# Patient Record
Sex: FEMALE | Race: WHITE | Employment: FULL TIME | URBAN - METROPOLITAN AREA
[De-identification: names, ages, dates, MRNs, and addresses within clinical notes are randomized per-mention and may not be internally consistent; named-entity substitution may affect disease eponyms.]

---

## 2018-01-03 ENCOUNTER — HOSPITAL ENCOUNTER (OUTPATIENT)
Dept: OTHER | Age: 63
Discharge: HOME | End: 2018-01-03
Attending: INTERNAL MEDICINE | Admitting: INTERNAL MEDICINE

## 2018-01-03 LAB
ALBUMIN SERPL-MCNC: 4.2 GM/DL (ref 3.4–5)
ALP BLD-CCNC: 59 IU/L (ref 40–128)
ALT SERPL-CCNC: 43 U/L (ref 10–40)
ANION GAP SERPL CALCULATED.3IONS-SCNC: 17 MMOL/L (ref 4–16)
AST SERPL-CCNC: 39 IU/L (ref 15–37)
BILIRUB SERPL-MCNC: 0.2 MG/DL (ref 0–1)
BUN BLDV-MCNC: 20 MG/DL (ref 6–23)
CALCIUM SERPL-MCNC: 9.4 MG/DL (ref 8.3–10.6)
CHLORIDE BLD-SCNC: 99 MMOL/L (ref 99–110)
CO2: 30 MMOL/L (ref 21–32)
CREAT SERPL-MCNC: 0.8 MG/DL (ref 0.6–1.1)
GFR AFRICAN AMERICAN: >60 ML/MIN/1.73M2
GFR NON-AFRICAN AMERICAN: >60 ML/MIN/1.73M2
GLUCOSE BLD-MCNC: 88 MG/DL (ref 70–99)
POTASSIUM SERPL-SCNC: 3.6 MMOL/L (ref 3.5–5.1)
SODIUM BLD-SCNC: 146 MMOL/L (ref 135–145)
TOTAL PROTEIN: 6.9 GM/DL (ref 6.4–8.2)

## 2018-01-06 LAB — CA 27.29: 19.4 U/ML

## 2018-04-18 ENCOUNTER — HOSPITAL ENCOUNTER (OUTPATIENT)
Dept: OTHER | Age: 63
Discharge: HOME | End: 2018-04-18
Attending: INTERNAL MEDICINE | Admitting: INTERNAL MEDICINE

## 2018-04-18 LAB
ALBUMIN SERPL-MCNC: 4.5 GM/DL (ref 3.4–5)
ALP BLD-CCNC: 54 IU/L (ref 40–129)
ALT SERPL-CCNC: 21 U/L (ref 10–40)
ANION GAP SERPL CALCULATED.3IONS-SCNC: 10 MMOL/L (ref 4–16)
AST SERPL-CCNC: 20 IU/L (ref 15–37)
BILIRUB SERPL-MCNC: 0.4 MG/DL (ref 0–1)
BUN BLDV-MCNC: 17 MG/DL (ref 6–23)
CALCIUM SERPL-MCNC: 9.5 MG/DL (ref 8.3–10.6)
CHLORIDE BLD-SCNC: 105 MMOL/L (ref 99–110)
CO2: 29 MMOL/L (ref 21–32)
CREAT SERPL-MCNC: 0.7 MG/DL (ref 0.6–1.1)
GFR AFRICAN AMERICAN: >60 ML/MIN/1.73M2
GFR NON-AFRICAN AMERICAN: >60 ML/MIN/1.73M2
GLUCOSE BLD-MCNC: 83 MG/DL (ref 70–99)
POTASSIUM SERPL-SCNC: 4.3 MMOL/L (ref 3.5–5.1)
SODIUM BLD-SCNC: 144 MMOL/L (ref 135–145)
TOTAL PROTEIN: 7.3 GM/DL (ref 6.4–8.2)

## 2018-04-19 ENCOUNTER — HOSPITAL ENCOUNTER (OUTPATIENT)
Dept: MAMMOGRAPHY | Age: 63
Discharge: HOME | End: 2018-04-19
Attending: INTERNAL MEDICINE | Admitting: INTERNAL MEDICINE

## 2018-04-19 DIAGNOSIS — Z12.31 VISIT FOR SCREENING MAMMOGRAM: ICD-10-CM

## 2018-05-02 ENCOUNTER — HOSPITAL ENCOUNTER (OUTPATIENT)
Dept: LAB | Age: 63
Discharge: HOME | End: 2018-05-02
Attending: INTERNAL MEDICINE | Admitting: INTERNAL MEDICINE

## 2018-05-02 LAB
ALBUMIN SERPL-MCNC: 4.3 GM/DL (ref 3.4–5)
ALP BLD-CCNC: 53 IU/L (ref 40–129)
ALT SERPL-CCNC: 19 U/L (ref 10–40)
ANION GAP SERPL CALCULATED.3IONS-SCNC: 12 MMOL/L (ref 4–16)
AST SERPL-CCNC: 19 IU/L (ref 15–37)
BASOPHILS ABSOLUTE: 0 K/CU MM
BASOPHILS RELATIVE PERCENT: 0.7 % (ref 0–1)
BILIRUB SERPL-MCNC: 0.3 MG/DL (ref 0–1)
BUN BLDV-MCNC: 20 MG/DL (ref 6–23)
C-REACTIVE PROTEIN, HIGH SENSITIVITY: 0.9 MG/L
CALCIUM SERPL-MCNC: 9.5 MG/DL (ref 8.3–10.6)
CHLORIDE BLD-SCNC: 104 MMOL/L (ref 99–110)
CO2: 27 MMOL/L (ref 21–32)
CREAT SERPL-MCNC: 0.8 MG/DL (ref 0.6–1.1)
DIFFERENTIAL TYPE: ABNORMAL
EOSINOPHILS ABSOLUTE: 0.2 K/CU MM
EOSINOPHILS RELATIVE PERCENT: 2.5 % (ref 0–3)
ERYTHROCYTE SEDIMENTATION RATE: 10 MM/HR (ref 0–30)
GFR AFRICAN AMERICAN: >60 ML/MIN/1.73M2
GFR NON-AFRICAN AMERICAN: >60 ML/MIN/1.73M2
GLUCOSE FASTING: 88 MG/DL (ref 70–99)
HCT VFR BLD CALC: 45.1 % (ref 37–47)
HEMOGLOBIN: 14.7 GM/DL (ref 12.5–16)
IGA: 215 MG/DL (ref 69–382)
IMMATURE NEUTROPHIL %: 0.3 % (ref 0–0.43)
LYMPHOCYTES ABSOLUTE: 1.8 K/CU MM
LYMPHOCYTES RELATIVE PERCENT: 29.7 % (ref 24–44)
MCH RBC QN AUTO: 32 PG (ref 27–31)
MCHC RBC AUTO-ENTMCNC: 32.6 % (ref 32–36)
MCV RBC AUTO: 98 FL (ref 78–100)
MONOCYTES ABSOLUTE: 0.6 K/CU MM
MONOCYTES RELATIVE PERCENT: 10.2 % (ref 0–4)
PDW BLD-RTO: 13.2 % (ref 11.7–14.9)
PLATELET # BLD: 210 K/CU MM (ref 140–440)
PMV BLD AUTO: 10.2 FL (ref 7.5–11.1)
POTASSIUM SERPL-SCNC: 4.5 MMOL/L (ref 3.5–5.1)
RBC # BLD: 4.6 M/CU MM (ref 4.2–5.4)
SEGMENTED NEUTROPHILS ABSOLUTE COUNT: 3.3 K/CU MM
SEGMENTED NEUTROPHILS RELATIVE PERCENT: 56.6 % (ref 36–66)
SODIUM BLD-SCNC: 143 MMOL/L (ref 135–145)
TOTAL IMMATURE NEUTOROPHIL: 0.02 K/CU MM
TOTAL PROTEIN: 7.2 GM/DL (ref 6.4–8.2)
WBC # BLD: 5.9 K/CU MM (ref 4–10.5)

## 2018-05-04 LAB
TISSUE TRANSGLUTAMINASE ANTIBODY: 1
TRANSGLUTAMINASE IGA: 0

## 2018-05-14 ENCOUNTER — HOSPITAL ENCOUNTER (OUTPATIENT)
Dept: CT IMAGING | Age: 63
Discharge: HOME | End: 2018-05-14
Attending: INTERNAL MEDICINE | Admitting: INTERNAL MEDICINE

## 2018-05-14 DIAGNOSIS — Z85.3 PERSONAL HISTORY OF MALIGNANT NEOPLASM OF BREAST: ICD-10-CM

## 2018-10-15 ENCOUNTER — HOSPITAL ENCOUNTER (OUTPATIENT)
Age: 63
Setting detail: SPECIMEN
Discharge: HOME | End: 2018-10-15
Payer: COMMERCIAL

## 2018-10-15 LAB
ALBUMIN SERPL-MCNC: 4.3 GM/DL (ref 3.4–5)
ALP BLD-CCNC: 61 IU/L (ref 40–129)
ALT SERPL-CCNC: 18 U/L (ref 10–40)
ANION GAP SERPL CALCULATED.3IONS-SCNC: 12 MMOL/L (ref 4–16)
AST SERPL-CCNC: 16 IU/L (ref 15–37)
BILIRUB SERPL-MCNC: 0.3 MG/DL (ref 0–1)
BUN BLDV-MCNC: 20 MG/DL (ref 6–23)
CALCIUM SERPL-MCNC: 9.2 MG/DL (ref 8.3–10.6)
CHLORIDE BLD-SCNC: 103 MMOL/L (ref 99–110)
CO2: 25 MMOL/L (ref 21–32)
CREAT SERPL-MCNC: 0.7 MG/DL (ref 0.6–1.1)
GFR AFRICAN AMERICAN: >60 ML/MIN/1.73M2
GFR NON-AFRICAN AMERICAN: >60 ML/MIN/1.73M2
GLUCOSE BLD-MCNC: 86 MG/DL (ref 70–99)
POTASSIUM SERPL-SCNC: 4.2 MMOL/L (ref 3.5–5.1)
SODIUM BLD-SCNC: 140 MMOL/L (ref 135–145)
TOTAL PROTEIN: 6.8 GM/DL (ref 6.4–8.2)

## 2018-10-15 PROCEDURE — 80053 COMPREHEN METABOLIC PANEL: CPT

## 2019-04-24 ENCOUNTER — HOSPITAL ENCOUNTER (OUTPATIENT)
Dept: MAMMOGRAPHY | Age: 64
Discharge: HOME | End: 2019-04-24
Payer: MEDICARE

## 2019-04-24 DIAGNOSIS — M81.0 SENILE OSTEOPOROSIS: ICD-10-CM

## 2019-04-24 DIAGNOSIS — Z12.31 SCREENING MAMMOGRAM, ENCOUNTER FOR: ICD-10-CM

## 2019-04-24 PROCEDURE — 77063 BREAST TOMOSYNTHESIS BI: CPT

## 2019-04-24 PROCEDURE — 77080 DXA BONE DENSITY AXIAL: CPT

## 2020-04-16 PROBLEM — M81.0 AGE-RELATED OSTEOPOROSIS WITHOUT CURRENT PATHOLOGICAL FRACTURE: Status: ACTIVE | Noted: 2020-04-16

## 2020-04-16 PROBLEM — R91.8 OTHER NONSPECIFIC ABNORMAL FINDING OF LUNG FIELD: Status: ACTIVE | Noted: 2020-04-16

## 2020-04-16 PROBLEM — Z85.3 PERSONAL HISTORY OF MALIGNANT NEOPLASM OF BREAST: Status: ACTIVE | Noted: 2020-04-16

## 2020-06-01 ENCOUNTER — HOSPITAL ENCOUNTER (OUTPATIENT)
Dept: MAMMOGRAPHY | Age: 65
Discharge: HOME | End: 2020-06-01
Payer: MEDICARE

## 2020-06-01 PROCEDURE — 77063 BREAST TOMOSYNTHESIS BI: CPT

## 2020-06-04 ENCOUNTER — HOSPITAL ENCOUNTER (OUTPATIENT)
Dept: MAMMOGRAPHY | Age: 65
Discharge: HOME | End: 2020-06-04
Payer: MEDICARE

## 2020-06-04 PROCEDURE — 77065 DX MAMMO INCL CAD UNI: CPT

## 2020-06-11 ENCOUNTER — HOSPITAL ENCOUNTER (OUTPATIENT)
Dept: INFUSION THERAPY | Age: 65
Discharge: HOME | End: 2020-06-11
Payer: MEDICARE

## 2020-06-11 PROCEDURE — 99211 OFF/OP EST MAY X REQ PHY/QHP: CPT

## 2020-09-02 RX ORDER — LETROZOLE 2.5 MG/1
2.5 TABLET, FILM COATED ORAL DAILY
Qty: 30 TABLET | Refills: 2 | Status: SHIPPED | OUTPATIENT
Start: 2020-09-02 | End: 2020-09-08 | Stop reason: SDUPTHER

## 2020-09-02 RX ORDER — LETROZOLE 2.5 MG/1
TABLET, FILM COATED ORAL
COMMUNITY
Start: 2020-07-29 | End: 2020-09-02 | Stop reason: SDUPTHER

## 2020-09-08 ENCOUNTER — TELEPHONE (OUTPATIENT)
Dept: ONCOLOGY | Age: 65
End: 2020-09-08

## 2020-09-08 RX ORDER — LETROZOLE 2.5 MG/1
2.5 TABLET, FILM COATED ORAL DAILY
Qty: 30 TABLET | Refills: 2 | Status: SHIPPED | OUTPATIENT
Start: 2020-09-08 | End: 2020-10-30

## 2020-10-30 RX ORDER — LETROZOLE 2.5 MG/1
TABLET, FILM COATED ORAL
Qty: 90 TABLET | Refills: 0 | Status: SHIPPED | OUTPATIENT
Start: 2020-10-30 | End: 2021-01-07

## 2020-12-17 ENCOUNTER — HOSPITAL ENCOUNTER (OUTPATIENT)
Dept: INFUSION THERAPY | Age: 65
Discharge: HOME | End: 2020-12-17
Payer: MEDICARE

## 2020-12-17 ENCOUNTER — OFFICE VISIT (OUTPATIENT)
Dept: ONCOLOGY | Age: 65
End: 2020-12-17
Payer: MEDICARE

## 2020-12-17 VITALS
OXYGEN SATURATION: 98 % | WEIGHT: 136 LBS | RESPIRATION RATE: 16 BRPM | DIASTOLIC BLOOD PRESSURE: 86 MMHG | BODY MASS INDEX: 24.1 KG/M2 | HEIGHT: 63 IN | TEMPERATURE: 96.1 F | HEART RATE: 77 BPM | SYSTOLIC BLOOD PRESSURE: 154 MMHG

## 2020-12-17 PROBLEM — Z12.31 OTHER SCREENING MAMMOGRAM: Status: ACTIVE | Noted: 2020-12-17

## 2020-12-17 PROBLEM — C50.912 INVASIVE DUCTAL CARCINOMA OF BREAST, FEMALE, LEFT (HCC): Status: ACTIVE | Noted: 2020-12-17

## 2020-12-17 PROCEDURE — G8420 CALC BMI NORM PARAMETERS: HCPCS | Performed by: INTERNAL MEDICINE

## 2020-12-17 PROCEDURE — 4040F PNEUMOC VAC/ADMIN/RCVD: CPT | Performed by: INTERNAL MEDICINE

## 2020-12-17 PROCEDURE — 1036F TOBACCO NON-USER: CPT | Performed by: INTERNAL MEDICINE

## 2020-12-17 PROCEDURE — 3017F COLORECTAL CA SCREEN DOC REV: CPT | Performed by: INTERNAL MEDICINE

## 2020-12-17 PROCEDURE — G8427 DOCREV CUR MEDS BY ELIG CLIN: HCPCS | Performed by: INTERNAL MEDICINE

## 2020-12-17 PROCEDURE — 1090F PRES/ABSN URINE INCON ASSESS: CPT | Performed by: INTERNAL MEDICINE

## 2020-12-17 PROCEDURE — G8399 PT W/DXA RESULTS DOCUMENT: HCPCS | Performed by: INTERNAL MEDICINE

## 2020-12-17 PROCEDURE — 1123F ACP DISCUSS/DSCN MKR DOCD: CPT | Performed by: INTERNAL MEDICINE

## 2020-12-17 PROCEDURE — G8484 FLU IMMUNIZE NO ADMIN: HCPCS | Performed by: INTERNAL MEDICINE

## 2020-12-17 PROCEDURE — 99211 OFF/OP EST MAY X REQ PHY/QHP: CPT

## 2020-12-17 PROCEDURE — 99213 OFFICE O/P EST LOW 20 MIN: CPT | Performed by: INTERNAL MEDICINE

## 2020-12-17 RX ORDER — LOSARTAN POTASSIUM 100 MG/1
TABLET ORAL
COMMUNITY
Start: 2020-12-14 | End: 2020-12-17

## 2020-12-17 RX ORDER — HYDROCHLOROTHIAZIDE 25 MG/1
TABLET ORAL
COMMUNITY
End: 2020-12-17

## 2020-12-17 RX ORDER — LOSARTAN POTASSIUM 100 MG/1
100 TABLET ORAL DAILY
COMMUNITY
Start: 2020-10-13

## 2020-12-17 RX ORDER — ASPIRIN 81 MG/1
81 TABLET ORAL DAILY
COMMUNITY
End: 2020-12-17

## 2020-12-17 RX ORDER — PHENOL 1.4 %
600 AEROSOL, SPRAY (ML) MUCOUS MEMBRANE 2 TIMES DAILY
COMMUNITY
End: 2021-08-03 | Stop reason: SDUPTHER

## 2020-12-17 RX ORDER — ATORVASTATIN CALCIUM 10 MG/1
10 TABLET, FILM COATED ORAL DAILY
COMMUNITY
Start: 2020-10-05

## 2020-12-17 RX ORDER — ASPIRIN 81 MG/1
TABLET, CHEWABLE ORAL
COMMUNITY

## 2020-12-17 RX ORDER — ATORVASTATIN CALCIUM 10 MG/1
TABLET, FILM COATED ORAL
COMMUNITY
Start: 2020-12-14 | End: 2020-12-17

## 2020-12-17 NOTE — PROGRESS NOTES
MA Rooming Questions  Patient: Fernando Prajapati  MRN: [id number]    Date: 12/17/2020        1. Do you have any new issues?   no         2. Do you need any refills on medications?    no    3. Have you had any imaging done since your last visit?   no    4. Have you been hospitalized or seen in the emergency room since your last visit here?   no    5. Did the patient have a depression screening completed today?  No    No data recorded     PHQ-9 Given to (if applicable):               PHQ-9 Score (if applicable):                     [] Positive     []  Negative              Does question #9 need addressed (if applicable)                     [] Yes    []  No               Ryland Cowan MA

## 2020-12-17 NOTE — PROGRESS NOTES
Patient Name: Bertina Aschoff  Patient : 1955  Patient MRN: [id number]     Primary Oncologist: Britton Bonilla MD  Referring Physician: LIZBETH Hernandez ([facility]), Carla Henriquez MD, Claire Hamlin MD       Date of Service: 2020      Chief Complaint:   Chief Complaint   Patient presents with    Follow-up    Results        Active Problem list  1. Personal history of malignant neoplasm of breast    2. Invasive ductal carcinoma of breast, female, left [facility])           HPI:        58-year-old female history of T2 N0 M0,ER [place] HER-2 positive infiltrating ductal carcinoma of the left breast status post lumpectomy sentinel lymph node biopsy on 2015 By Dr. Mimi Greenfield. She was treated with adjuvant Taxotere carboplatin and Herceptin for 6 cycles followed by Herceptin for total one year until 2016. Since she has completed radiation, by Dr Mihir Ferro, Receiving a total dose of 45 gray To the whole breast and 61 gray to the lumpectomy cavity boost. and has been on AI since 2016. She has osteoporosis she is taking calcium vitamin D.   2015 she had a T score -2.1 left femoral trochanter noted to be osteoporotic. 2015 EF 60%  2017 mammogram negative  2017 her CA 27.29 was 21.9. Vitamin D was 40   2017 status post Zometa ×1   She is compliant with her AI. And for the most part has no side effects. He does report though that over the last 4-5 months she does have some left chest wall axillary aches and pains. It is definitely related to when she was picking things up that are heavy. She has not noticed any lymphadenopathy lumps or bumps or weakness or paresthesias or swelling. These symptoms have not gotten worse. And as mentioned she definitely notices a relationship to this in regards to her working.    2019: Dexa scan:IMPRESSION:  Osteopenia by WHO criteria.      20202: Mammogram:IMPRESSION:  Coarse appearing dystrophic calcifications are developing in the lumpectomy  bed in the lateral left breast. These have benign features on the  magnification views and routine annual screening mammography is recommended. BIRADS:  BIRADS - CATEGORY 2    12/10/2020 [place] [address] 925 CA 15-3 34 CBC and CMP within normal limits    Interval History  12/17/2020: Arrived Alone to the clinic today. Reported that she is compliant and Tolerating AI fairly well. Denied any hot flashes, night sweats, muscle or joint pains. Overall feels very great. Denied any chest pain, increase shortness of breath, palpitations or dizziness. Denied any abdominal pain, nausea, vomiting, diarrhea, constipation. Denied any headaches or vision changes or any focal weakness. Reported that she has been smoking 1 pack per 2 weeks. Review of Systems   Per interval history; otherwise 10 point ROS is negative              Vital Signs:  BP (!) 154/86 (Site: Right Upper Arm, Position: Sitting, Cuff Size: Medium Adult)   Pulse 77   Temp 96.1 °F (35.6 °C) (Infrared)   Resp 16   Ht 5' 3\" (1.6 m)   Wt 136 lb (61.7 kg)   SpO2 98%   BMI 24.09 kg/m²     Physical Exam:    CONSTITUTIONAL: awake, alert, obese  EYES: ANTIONETTE, no palor or any icetrus  ENT: ATNC  NECK: No JVD, no lad  HEMATOLOGIC/LYMPHATIC: no cervical, supraclavicular or axillary lymphadenopathy   LUNGS: CTAB  CARDIOVASCULAR: rrr s1s2   ABDOMEN: soft ntnd bs pos  NEUROLOGIC:GI  SKIN: No rash  EXTREMITIES: no LE edema bilaterally  Breast: Rt breast no masses, no axillary lad, left breast lumpectomy changes. Underlying fibrosis, no masses, no erythema, no nipple discharge.      Labs:    Hematology:  Lab Results   Component Value Date    WBC 5.9 05/02/2018    RBC 4.60 05/02/2018    HGB 14.7 05/02/2018    HCT 45.1 05/02/2018    MCV 98.0 05/02/2018    MCH 32.0 (H) 05/02/2018    MCHC 32.6 05/02/2018    RDW 13.2 05/02/2018     05/02/2018    MPV 10.2 05/02/2018    SEGSPCT 56.6 05/02/2018    EOSRELPCT 2.5 05/02/2018    BASOPCT 0.7 05/02/2018    LYMPHOPCT 29.7 05/02/2018 MONOPCT 10.2 (H) 05/02/2018    SEGSABS 3.3 05/02/2018    EOSABS 0.2 05/02/2018    BASOSABS 0.0 05/02/2018    LYMPHSABS 1.8 05/02/2018    MONOSABS 0.6 05/02/2018    DIFFTYPE AUTOMATED DIFFERENTIAL 05/02/2018     Lab Results   Component Value Date    ESR 10 05/02/2018       Chemistry:  Lab Results   Component Value Date     10/15/2018    K 4.2 10/15/2018     10/15/2018    CO2 25 10/15/2018    BUN 20 10/15/2018    CREATININE 0.7 10/15/2018    GLUCOSE 86 10/15/2018    CALCIUM 9.2 10/15/2018    PROT 6.8 10/15/2018    LABALBU 4.3 10/15/2018    BILITOT 0.3 10/15/2018    ALKPHOS 61 10/15/2018    AST 16 10/15/2018    ALT 18 10/15/2018    LABGLOM >60 10/15/2018    GFRAA >60 10/15/2018     No results found for: MMA, LDH, HOMOCYSTEINE  No components found for: LD  Lab Results   Component Value Date    TSHHS 0.826 06/24/2011       Immunology:  Lab Results   Component Value Date    PROT 6.8 10/15/2018     No results found for: Gerson Oliva, KLFLCR  No results found for: B2M    Coagulation Panel:  No results found for: PROTIME, INR, APTT, DDIMER    Anemia Panel:  No results found for: [id number], FOLATE    Tumor Markers:  Lab Results   Component Value Date    LABCA2 19.4 01/03/2018         Imaging: Reviewed     Pathology:Reviewed     Observations:  Performance Status: ECOG 0  Depression Status: No data recorded        Assessment & Plan:  Assessment plan 48-year-old female history of a T2 N0 M0 ER [place] positive HER-2 positive infiltrating ductal carcinoma left breast status post lumpectomy sentinel lymph node. Status post adjuvant Taxotere carboplatin and Herceptin ×6. Status post radiation  June 2016. Is on AI since June 2016, plan to continue for atleast 5 years, till 2021  Mammogram June 2020 normal, will repeat in a year. Dexa scan April 2019 with osteopenia, will repeat in 2 years. Continue vitamin D and calcium supplements. 6 mm right lower lobe lung nodule: Repeat Ct chest may 2018 with no nodules. Continue other medical care. Discussed above findings and plan with her and she verbalized understanding. Discussed healthy lifestyle including smoking cessation, healthy diet and regular exercise as tolerated. Also discussed importance of being up-to-date with age-appropriate screening tools. Recommend follow-up with primary care physician and other specialist.    Return to clinic June 2021 or earlier if new symptoms. KIM      .            Electronically signed by Anita Escobar MD on 12/17/2020 at 4:26 PM

## 2021-01-07 RX ORDER — LETROZOLE 2.5 MG/1
TABLET, FILM COATED ORAL
Qty: 90 TABLET | Refills: 0 | Status: SHIPPED | OUTPATIENT
Start: 2021-01-07 | End: 2021-03-15

## 2021-03-15 RX ORDER — LETROZOLE 2.5 MG/1
TABLET, FILM COATED ORAL
Qty: 90 TABLET | Refills: 0 | Status: SHIPPED | OUTPATIENT
Start: 2021-03-15 | End: 2021-05-24

## 2021-05-24 RX ORDER — LETROZOLE 2.5 MG/1
TABLET, FILM COATED ORAL
Qty: 90 TABLET | Refills: 0 | Status: SHIPPED | OUTPATIENT
Start: 2021-05-24 | End: 2021-07-29

## 2021-05-28 DIAGNOSIS — Z12.31 SCREENING MAMMOGRAM FOR HIGH-RISK PATIENT: Primary | ICD-10-CM

## 2021-06-07 ENCOUNTER — TELEPHONE (OUTPATIENT)
Dept: ONCOLOGY | Age: 66
End: 2021-06-07

## 2021-06-07 NOTE — TELEPHONE ENCOUNTER
Pt is going to 6/16 12:25 arrival at [facility]-- pt is aware of appt and stated understanding

## 2021-06-16 ENCOUNTER — HOSPITAL ENCOUNTER (OUTPATIENT)
Dept: MAMMOGRAPHY | Age: 66
Discharge: HOME | End: 2021-06-16
Payer: MEDICARE

## 2021-06-16 DIAGNOSIS — Z12.31 BREAST CANCER SCREENING BY MAMMOGRAM: ICD-10-CM

## 2021-06-16 DIAGNOSIS — C50.912 INVASIVE DUCTAL CARCINOMA OF BREAST, FEMALE, LEFT (HCC): ICD-10-CM

## 2021-06-16 PROCEDURE — 77063 BREAST TOMOSYNTHESIS BI: CPT

## 2021-06-21 ENCOUNTER — HOSPITAL ENCOUNTER (OUTPATIENT)
Dept: INFUSION THERAPY | Age: 66
Discharge: HOME | End: 2021-06-21
Payer: MEDICARE

## 2021-06-21 ENCOUNTER — OFFICE VISIT (OUTPATIENT)
Dept: ONCOLOGY | Age: 66
End: 2021-06-21
Payer: MEDICARE

## 2021-06-21 VITALS
OXYGEN SATURATION: 98 % | DIASTOLIC BLOOD PRESSURE: 72 MMHG | BODY MASS INDEX: 23.64 KG/M2 | HEART RATE: 84 BPM | TEMPERATURE: 98.2 F | RESPIRATION RATE: 16 BRPM | SYSTOLIC BLOOD PRESSURE: 153 MMHG | HEIGHT: 63 IN | WEIGHT: 133.4 LBS

## 2021-06-21 DIAGNOSIS — R91.1 LUNG NODULE: ICD-10-CM

## 2021-06-21 DIAGNOSIS — M81.0 AGE-RELATED OSTEOPOROSIS WITHOUT CURRENT PATHOLOGICAL FRACTURE: ICD-10-CM

## 2021-06-21 DIAGNOSIS — C50.912 INVASIVE DUCTAL CARCINOMA OF BREAST, FEMALE, LEFT (HCC): Primary | ICD-10-CM

## 2021-06-21 PROCEDURE — 1123F ACP DISCUSS/DSCN MKR DOCD: CPT | Performed by: INTERNAL MEDICINE

## 2021-06-21 PROCEDURE — G8420 CALC BMI NORM PARAMETERS: HCPCS | Performed by: INTERNAL MEDICINE

## 2021-06-21 PROCEDURE — 1036F TOBACCO NON-USER: CPT | Performed by: INTERNAL MEDICINE

## 2021-06-21 PROCEDURE — G8399 PT W/DXA RESULTS DOCUMENT: HCPCS | Performed by: INTERNAL MEDICINE

## 2021-06-21 PROCEDURE — G8427 DOCREV CUR MEDS BY ELIG CLIN: HCPCS | Performed by: INTERNAL MEDICINE

## 2021-06-21 PROCEDURE — 99211 OFF/OP EST MAY X REQ PHY/QHP: CPT

## 2021-06-21 PROCEDURE — 1090F PRES/ABSN URINE INCON ASSESS: CPT | Performed by: INTERNAL MEDICINE

## 2021-06-21 PROCEDURE — 3017F COLORECTAL CA SCREEN DOC REV: CPT | Performed by: INTERNAL MEDICINE

## 2021-06-21 PROCEDURE — 99214 OFFICE O/P EST MOD 30 MIN: CPT | Performed by: INTERNAL MEDICINE

## 2021-06-21 PROCEDURE — 4040F PNEUMOC VAC/ADMIN/RCVD: CPT | Performed by: INTERNAL MEDICINE

## 2021-06-21 RX ORDER — M-VIT,TX,IRON,MINS/CALC/FOLIC 27MG-0.4MG
1 TABLET ORAL DAILY
COMMUNITY

## 2021-06-21 RX ORDER — AMLODIPINE BESYLATE 10 MG/1
10 TABLET ORAL DAILY
COMMUNITY

## 2021-06-21 NOTE — PROGRESS NOTES
Patient Name: Dea Villafuerte  Patient : 1955  Patient MRN: [id number]     Primary Oncologist: Karolyn Pedro MD  Referring Physician: LIZBETH Gilliland ([facility]), Altagracia Foy MD, Gemma Ramirez MD       Date of Service: 2021      Chief Complaint:   Chief Complaint   Patient presents with    Follow-up        Active Problem list  1. Invasive ductal carcinoma of breast, female, left ([address]    2. Age-related osteoporosis without current pathological fracture           HPI:        65-year-old female history of T2 N0 M0,ER [place] HER-2 positive infiltrating ductal carcinoma of the left breast status post lumpectomy sentinel lymph node biopsy on 2015 By Dr. Aniya Omalley. She was treated with adjuvant Taxotere carboplatin and Herceptin for 6 cycles followed by Herceptin for total one year until 2016. Since she has completed radiation, by Dr Jack Barroso, Receiving a total dose of 45 gray To the whole breast and 61 gray to the lumpectomy cavity boost. and has been on AI since 2016. She has osteoporosis she is taking calcium vitamin D.   2015 she had a T score -2.1 left femoral trochanter noted to be osteoporotic. 2015 EF 60%  2017 mammogram negative  2017 her CA 27.29 was 21.9. Vitamin D was 40   2017 status post Zometa ×1   She is compliant with her AI. And for the most part has no side effects. He does report though that over the last 4-5 months she does have some left chest wall axillary aches and pains. It is definitely related to when she was picking things up that are heavy. She has not noticed any lymphadenopathy lumps or bumps or weakness or paresthesias or swelling. These symptoms have not gotten worse. And as mentioned she definitely notices a relationship to this in regards to her working.    2019: Dexa scan:IMPRESSION:  Osteopenia by WHO criteria.      : Mammogram:IMPRESSION:  Coarse appearing dystrophic calcifications are developing in the lumpectomy  bed in the lateral left breast. These have benign features on the  magnification views and routine annual screening mammography is recommended. BIRADS:  BIRADS - CATEGORY 2    12/10/2020 [place] [address] 925 CA 15-3 34 CBC and CMP within normal limits    6/16/21:Mammogram :  Single view asymmetry in the medial left breast for which further evaluation   with compression views and possible ultrasound is recommended.       BIRADS:   BIRADS - CATEGORY 0       Incomplete: Needs Additional Imaging Evaluation         Interval History  6/21/2021: Arrived Alone to the clinic today. Reported that she is compliant and Tolerating AI fairly well. Denied any hot flashes, night sweats, muscle or joint pains. Overall feels very great. Denied any chest pain, increase shortness of breath, palpitations or dizziness. Denied any abdominal pain, nausea, vomiting, diarrhea, constipation. Denied any headaches or vision changes or any focal weakness. Reported that she has been smoking 1 pack per 2 weeks. No new breast masses or axillary lad. No vision changes. Review of Systems   Per interval history; otherwise 10 point ROS is negative              Vital Signs:  BP (!) 153/72 (Site: Right Upper Arm, Position: Sitting, Cuff Size: Medium Adult)   Pulse 84   Temp 98.2 °F (36.8 °C) (Temporal)   Resp 16   Ht 5' 3\" (1.6 m)   Wt 133 lb 6.4 oz (60.5 kg)   SpO2 98%   BMI 23.63 kg/m²     Physical Exam:    CONSTITUTIONAL: awake, alert, obese  EYES: ANTIONETTE, no palor or any icetrus  ENT: ATNC  NECK: No JVD, no lad  HEMATOLOGIC/LYMPHATIC: no cervical, supraclavicular or axillary lymphadenopathy   LUNGS: CTAB  CARDIOVASCULAR: rrr s1s2   ABDOMEN: soft ntnd bs pos  NEUROLOGIC:GI  SKIN: No rash  EXTREMITIES: no LE edema bilaterally  Breast: Rt breast no masses, no axillary lad, left breast lumpectomy changes. Underlying fibrosis, no masses, no erythema, no nipple discharge.      Labs:    Hematology:  Lab Results   Component Value Date    WBC 5.9 radiation  June 2016. Is on AI since June 2016, plan to continue for 10 years, till 2026  Dexa scan April 2019 with osteopenia, will repeat this year. Continue vitamin D and calcium supplements. Exercise as tolerated  Mammogram June 2021 with left breast asymmetry, ultrasound pending  Ca 27-29 18 in June 2021     6 mm right lower lobe lung nodule: Repeat Ct chest may 2018 with no nodules. Will repeat CT chest. Recommend smoking cessation. Continue other medical care. Discussed above findings and plan with her and she verbalized understanding. Discussed healthy lifestyle including smoking cessation, healthy diet and regular exercise as tolerated. Also discussed importance of being up-to-date with age-appropriate screening tools. Never had colonoscopy. Awaiting cologuard. Does not have pap smear opr any more pelvic exams. Recommend follow-up with primary care physician and other specialist.  Received 2 doses of COVID vaccine, [address] [place]. Return to clinic Dec 16 2021   or earlier if new symptoms. KIM      .            Electronically signed by Marty Dc MD on 6/21/2021 at 10:29 AM

## 2021-06-21 NOTE — PROGRESS NOTES
MA Rooming Questions  Patient: Love Rajan  MRN: [id number]    Date: 6/21/2021        1. Do you have any new issues?   no         2. Do you need any refills on medications?    no    3. Have you had any imaging done since your last visit? yes - Mammo, goes back in Thursday morning to have it done again, maybe U/S    4. Have you been hospitalized or seen in the emergency room since your last visit here?   no    5. Did the patient have a depression screening completed today?  No    No data recorded     PHQ-9 Given to (if applicable):               PHQ-9 Score (if applicable):                     [] Positive     []  Negative              Does question #9 need addressed (if applicable)                     [] Yes    []  No               Rashaad Cook CMA

## 2021-06-22 ENCOUNTER — TELEPHONE (OUTPATIENT)
Dept: ONCOLOGY | Age: 66
End: 2021-06-22

## 2021-06-22 NOTE — TELEPHONE ENCOUNTER
Pt is going to the [facility] 6/30 9:00 arrival for a 9:30 arrival-- no prep and pt is aware of appt

## 2021-06-24 ENCOUNTER — HOSPITAL ENCOUNTER (OUTPATIENT)
Dept: ULTRASOUND IMAGING | Age: 66
Discharge: HOME | End: 2021-06-24
Payer: MEDICARE

## 2021-06-24 ENCOUNTER — HOSPITAL ENCOUNTER (OUTPATIENT)
Dept: MAMMOGRAPHY | Age: 66
Discharge: HOME | End: 2021-06-24
Payer: MEDICARE

## 2021-06-24 DIAGNOSIS — R92.8 ABNORMAL SCREENING MAMMOGRAM: ICD-10-CM

## 2021-06-24 PROCEDURE — G0279 TOMOSYNTHESIS, MAMMO: HCPCS

## 2021-06-28 DIAGNOSIS — Z78.0 MENOPAUSE: Primary | ICD-10-CM

## 2021-06-28 NOTE — PROGRESS NOTES
Patient due for dexa bone density - last done on 4/24/19. Per Dr. Julio Gupta - order placed to be scheduled within next week.

## 2021-06-30 ENCOUNTER — HOSPITAL ENCOUNTER (OUTPATIENT)
Dept: CT IMAGING | Age: 66
Discharge: HOME | End: 2021-06-30
Payer: MEDICARE

## 2021-06-30 DIAGNOSIS — R91.1 LUNG NODULE: ICD-10-CM

## 2021-06-30 DIAGNOSIS — C50.912 INVASIVE DUCTAL CARCINOMA OF BREAST, FEMALE, LEFT (HCC): ICD-10-CM

## 2021-06-30 PROCEDURE — 71271 CT THORAX LUNG CANCER SCR C-: CPT

## 2021-07-06 ENCOUNTER — TELEPHONE (OUTPATIENT)
Dept: CASE MANAGEMENT | Age: 66
End: 2021-07-06

## 2021-07-06 ENCOUNTER — TELEPHONE (OUTPATIENT)
Dept: ONCOLOGY | Age: 66
End: 2021-07-06

## 2021-07-06 NOTE — TELEPHONE ENCOUNTER
Phoned pt and reviewed results of CT scan. Explained that Dr Clemencia Silva would like pt to take B12. Pt expressed understanding.

## 2021-07-06 NOTE — TELEPHONE ENCOUNTER
Bella Cerna returned this RNs phone call. Patient informed lung screening results were negative, patient voices unde standing. No further needs expressed.

## 2021-07-06 NOTE — TELEPHONE ENCOUNTER
Attempted to call patient to review LDCT results as per Dr. Lamonte Bryant request. Ned Peoples left for patient.

## 2021-07-29 RX ORDER — LETROZOLE 2.5 MG/1
TABLET, FILM COATED ORAL
Qty: 90 TABLET | Refills: 0 | Status: SHIPPED | OUTPATIENT
Start: 2021-07-29 | End: 2021-10-07

## 2021-08-02 ENCOUNTER — HOSPITAL ENCOUNTER (OUTPATIENT)
Dept: MAMMOGRAPHY | Age: 66
Discharge: HOME | End: 2021-08-02
Payer: MEDICARE

## 2021-08-02 DIAGNOSIS — Z78.0 MENOPAUSE: ICD-10-CM

## 2021-08-02 PROCEDURE — 77080 DXA BONE DENSITY AXIAL: CPT

## 2021-08-03 ENCOUNTER — TELEPHONE (OUTPATIENT)
Dept: ONCOLOGY | Age: 66
End: 2021-08-03

## 2021-08-03 RX ORDER — SWAB
1 SWAB, NON-MEDICATED MISCELLANEOUS DAILY
Qty: 30 CAPSULE | Refills: 3 | Status: SHIPPED | OUTPATIENT
Start: 2021-08-03

## 2021-08-03 RX ORDER — PHENOL 1.4 %
600 AEROSOL, SPRAY (ML) MUCOUS MEMBRANE 2 TIMES DAILY
Qty: 30 TABLET | Refills: 3 | Status: SHIPPED | OUTPATIENT
Start: 2021-08-03

## 2021-08-03 NOTE — TELEPHONE ENCOUNTER
Spoke with pt and reviewed Dr Cindy Cardoso instructions to take Calcium and Vit D. Script sent to pt's home delivery pharmacy.

## 2021-10-07 RX ORDER — LETROZOLE 2.5 MG/1
TABLET, FILM COATED ORAL
Qty: 90 TABLET | Refills: 0 | Status: SHIPPED | OUTPATIENT
Start: 2021-10-07 | End: 2021-12-14

## 2021-12-14 RX ORDER — LETROZOLE 2.5 MG/1
TABLET, FILM COATED ORAL
Qty: 90 TABLET | Refills: 0 | Status: SHIPPED | OUTPATIENT
Start: 2021-12-14 | End: 2022-02-24

## 2021-12-30 ENCOUNTER — OFFICE VISIT (OUTPATIENT)
Dept: ONCOLOGY | Age: 66
End: 2021-12-30
Payer: MEDICARE

## 2021-12-30 ENCOUNTER — HOSPITAL ENCOUNTER (OUTPATIENT)
Dept: INFUSION THERAPY | Age: 66
Discharge: HOME | End: 2021-12-30
Payer: MEDICARE

## 2021-12-30 VITALS
BODY MASS INDEX: 22.22 KG/M2 | RESPIRATION RATE: 16 BRPM | HEIGHT: 63 IN | WEIGHT: 125.4 LBS | OXYGEN SATURATION: 97 % | DIASTOLIC BLOOD PRESSURE: 63 MMHG | HEART RATE: 84 BPM | SYSTOLIC BLOOD PRESSURE: 147 MMHG | TEMPERATURE: 97.3 F

## 2021-12-30 DIAGNOSIS — Z12.31 OTHER SCREENING MAMMOGRAM: ICD-10-CM

## 2021-12-30 DIAGNOSIS — R91.1 LUNG NODULE: ICD-10-CM

## 2021-12-30 DIAGNOSIS — Z12.31 SCREENING MAMMOGRAM FOR HIGH-RISK PATIENT: ICD-10-CM

## 2021-12-30 DIAGNOSIS — M81.0 AGE-RELATED OSTEOPOROSIS WITHOUT CURRENT PATHOLOGICAL FRACTURE: ICD-10-CM

## 2021-12-30 DIAGNOSIS — C50.912 INVASIVE DUCTAL CARCINOMA OF BREAST, FEMALE, LEFT (HCC): Primary | ICD-10-CM

## 2021-12-30 PROCEDURE — G8484 FLU IMMUNIZE NO ADMIN: HCPCS | Performed by: INTERNAL MEDICINE

## 2021-12-30 PROCEDURE — 1090F PRES/ABSN URINE INCON ASSESS: CPT | Performed by: INTERNAL MEDICINE

## 2021-12-30 PROCEDURE — G8420 CALC BMI NORM PARAMETERS: HCPCS | Performed by: INTERNAL MEDICINE

## 2021-12-30 PROCEDURE — G8399 PT W/DXA RESULTS DOCUMENT: HCPCS | Performed by: INTERNAL MEDICINE

## 2021-12-30 PROCEDURE — 1123F ACP DISCUSS/DSCN MKR DOCD: CPT | Performed by: INTERNAL MEDICINE

## 2021-12-30 PROCEDURE — 3017F COLORECTAL CA SCREEN DOC REV: CPT | Performed by: INTERNAL MEDICINE

## 2021-12-30 PROCEDURE — G8427 DOCREV CUR MEDS BY ELIG CLIN: HCPCS | Performed by: INTERNAL MEDICINE

## 2021-12-30 PROCEDURE — 4040F PNEUMOC VAC/ADMIN/RCVD: CPT | Performed by: INTERNAL MEDICINE

## 2021-12-30 PROCEDURE — 99214 OFFICE O/P EST MOD 30 MIN: CPT | Performed by: INTERNAL MEDICINE

## 2021-12-30 PROCEDURE — 99211 OFF/OP EST MAY X REQ PHY/QHP: CPT

## 2021-12-30 PROCEDURE — 1036F TOBACCO NON-USER: CPT | Performed by: INTERNAL MEDICINE

## 2021-12-30 ASSESSMENT — PATIENT HEALTH QUESTIONNAIRE - PHQ9
1. LITTLE INTEREST OR PLEASURE IN DOING THINGS: 0
SUM OF ALL RESPONSES TO PHQ QUESTIONS 1-9: 0
SUM OF ALL RESPONSES TO PHQ9 QUESTIONS 1 & 2: 0
SUM OF ALL RESPONSES TO PHQ QUESTIONS 1-9: 0
SUM OF ALL RESPONSES TO PHQ QUESTIONS 1-9: 0
2. FEELING DOWN, DEPRESSED OR HOPELESS: 0

## 2021-12-30 NOTE — PROGRESS NOTES
MA Rooming Questions  Patient: Lázaro Kelley  MRN: [id number]    Date: 12/30/2021        1. Do you have any new issues?   no         2. Do you need any refills on medications?    no    3. Have you had any imaging done since your last visit?   no    4. Have you been hospitalized or seen in the emergency room since your last visit here?   no    5. Did the patient have a depression screening completed today?  Yes    PHQ-9 Total Score: 0 (12/30/2021  9:33 AM)       PHQ-9 Given to (if applicable):               PHQ-9 Score (if applicable):                     [] Positive     [x]  Negative              Does question #9 need addressed (if applicable)                     [] Yes    []  No               Leretha Boas, CMA

## 2021-12-30 NOTE — PROGRESS NOTES
Patient Name: Aquilino Castro  Patient : 1955  Patient MRN: [id number]     Primary Oncologist: Otis Nuñez MD  Referring Physician: LIZBETH Costa ([facility]), Kristen Kemp MD, Dayana López MD       Date of Service: 2021      Chief Complaint:   Chief Complaint   Patient presents with    Follow-up        Active Problem list  1. Invasive ductal carcinoma of breast, female, left ([address]    2. Age-related osteoporosis without current pathological fracture    3. Lung nodule    4. Screening mammogram for high-risk patient    5. Other screening mammogram           HPI:        34-year-old female history of T2 N0 M0,ER [place] HER-2 positive infiltrating ductal carcinoma of the left breast status post lumpectomy sentinel lymph node biopsy on 2015 By Dr. Lotus Vazquez. She was treated with adjuvant Taxotere carboplatin and Herceptin for 6 cycles followed by Herceptin for total one year until 2016. Since she has completed radiation, by Dr Yunior Gandara, Receiving a total dose of 45 gray To the whole breast and 61 gray to the lumpectomy cavity boost. and has been on AI since 2016. She has osteoporosis she is taking calcium vitamin D.   2015 she had a T score -2.1 left femoral trochanter noted to be osteoporotic. 2015 EF 60%  2017 mammogram negative  2017 her CA 27.29 was 21.9. Vitamin D was 40   2017 status post Zometa ×1   She is compliant with her AI. And for the most part has no side effects. He does report though that over the last 4-5 months she does have some left chest wall axillary aches and pains. It is definitely related to when she was picking things up that are heavy. She has not noticed any lymphadenopathy lumps or bumps or weakness or paresthesias or swelling. These symptoms have not gotten worse. And as mentioned she definitely notices a relationship to this in regards to her working.    2019: Dexa scan:IMPRESSION:  Osteopenia by WHO criteria.      : Mammogram:IMPRESSION:  Coarse appearing dystrophic calcifications are developing in the lumpectomy  bed in the lateral left breast. These have benign features on the  magnification views and routine annual screening mammography is recommended. BIRADS:  BIRADS - CATEGORY 2    12/10/2020 [place] [address] 925 CA 15-3 34 CBC and CMP within normal limits    21:Mammogram :  Single view asymmetry in the medial left breast for which further evaluation   with compression views and possible ultrasound is recommended.       BIRADS:   BIRADS - CATEGORY 0       Incomplete: Needs Additional Imaging Evaluation     2021: Diagnostic mammogram  Benign diagnostic left mammogram.  Routine screening mammogram is recommended.       BIRADS:   BIRADS - CATEGORY 1       Negative.  Normal interval follow-up is recommended in 12 months.       OVERALL ASSESSMENT - NEGATIVE     2021: CT lung screenin. No suspicious pulmonary nodules. 2. Sequelae of granulomatous disease. 3. Minimal centrilobular and paraseptal emphysema. 4. Similar appearance of suspected subpleural bronchiolectasis with right   lower lobe predominance, potentially sequelae of interstitial fibrosis of   indeterminate pattern. 5. Minimal to mild coronary atherosclerotic calcifications. 2021 Dexa scan with osteopenia. Interval History  2021: Arrived Alone to the clinic today. Reported that she is compliant and Tolerating AI fairly well. No hot flashes, new  muscle pains,new joint pains     Overall feels very great. Denied any chest pain, increase shortness of breath, palpitations or dizziness. Denied any abdominal pain, nausea, vomiting, diarrhea, constipation. Denied any headaches or vision changes or any focal weakness. Reported that she has been smoking 1 pack per 4 weeks. No new breast masses or axillary lad. No vision changes.      Review of Systems   Per interval history; otherwise 10 point ROS is negative Vital Signs:  BP (!) 147/63 (Site: Right Upper Arm, Position: Sitting, Cuff Size: Medium Adult)   Pulse 84   Temp 97.3 °F (36.3 °C) (Infrared)   Resp 16   Ht 5' 3\" (1.6 m)   Wt 125 lb 6.4 oz (56.9 kg)   SpO2 97%   BMI 22.21 kg/m²     Physical Exam:    CONSTITUTIONAL: awake, alert, obese  EYES: ANTIONETTE, no palor or any icetrus  ENT: ATNC  NECK: No JVD, no lad  HEMATOLOGIC/LYMPHATIC: no cervical, supraclavicular or axillary lymphadenopathy   LUNGS: CTAB  CARDIOVASCULAR: rrr s1s2   ABDOMEN: soft ntnd bs pos  NEUROLOGIC:GI  SKIN: No rash  EXTREMITIES: no LE edema bilaterally  Breast: Rt breast no masses, no axillary lad bilaterally , left breast lumpectomy changes. Underlying fibrosis, no masses, no erythema, no nipple discharge.      Labs:    Hematology:  Lab Results   Component Value Date    WBC 5.9 05/02/2018    RBC 4.60 05/02/2018    HGB 14.7 05/02/2018    HCT 45.1 05/02/2018    MCV 98.0 05/02/2018    MCH 32.0 (H) 05/02/2018    MCHC 32.6 05/02/2018    RDW 13.2 05/02/2018     05/02/2018    MPV 10.2 05/02/2018    SEGSPCT 56.6 05/02/2018    EOSRELPCT 2.5 05/02/2018    BASOPCT 0.7 05/02/2018    LYMPHOPCT 29.7 05/02/2018    MONOPCT 10.2 (H) 05/02/2018    SEGSABS 3.3 05/02/2018    EOSABS 0.2 05/02/2018    BASOSABS 0.0 05/02/2018    LYMPHSABS 1.8 05/02/2018    MONOSABS 0.6 05/02/2018    DIFFTYPE AUTOMATED DIFFERENTIAL 05/02/2018     Lab Results   Component Value Date    ESR 10 05/02/2018       Chemistry:  Lab Results   Component Value Date     10/15/2018    K 4.2 10/15/2018     10/15/2018    CO2 25 10/15/2018    BUN 20 10/15/2018    CREATININE 0.7 10/15/2018    GLUCOSE 86 10/15/2018    CALCIUM 9.2 10/15/2018    PROT 6.8 10/15/2018    LABALBU 4.3 10/15/2018    BILITOT 0.3 10/15/2018    ALKPHOS 61 10/15/2018    AST 16 10/15/2018    ALT 18 10/15/2018    LABGLOM >60 10/15/2018    GFRAA >60 10/15/2018     No results found for: MMA, LDH, HOMOCYSTEINE  No components found for: LD  Lab Results   Component Value Date    TSHHS 0.826 06/24/2011       Immunology:  Lab Results   Component Value Date    PROT 6.8 10/15/2018     No results found for: Cloyde Bohr, KLFLCR  No results found for: B2M    Coagulation Panel:  No results found for: PROTIME, INR, APTT, DDIMER    Anemia Panel:  No results found for: [id number], FOLATE    Tumor Markers:  Lab Results   Component Value Date    LABCA2 19.4 01/03/2018         Imaging: Reviewed     Pathology:Reviewed     Observations:  Performance Status: ECOG 0  Depression Status: PHQ-9 Total Score: 0 (12/30/2021  9:33 AM)          Assessment & Plan:   79-year-old female history of a T2 N0 M0 ER [place] positive HER-2 positive infiltrating ductal carcinoma left breast status post lumpectomy sentinel lymph node sep 2015. Status post adjuvant Taxotere carboplatin and Herceptin ×6. Status post radiation  June 2016. Is on AI since June 2016, plan to continue for 10 years, till 2026  Dexa scan August 2021  with osteopenia. Continue vitamin D and calcium supplements. Exercise as tolerated  Mammogram June 2021 with left breast asymmetry, ultrasound neg. Repeat diagnostic mammogram in June 2022   Ca 27-29 18 in June 2021     6 mm right lower lobe lung nodule: Repeat Ct chest June 2021 with no nodules. Recommend smoking cessation. Repeat CT chest June 2022     Continue other medical care. Discussed above findings and plan with her and she verbalized understanding. Discussed healthy lifestyle including smoking cessation, healthy diet and regular exercise as tolerated. Also discussed importance of being up-to-date with age-appropriate screening tools. Never had colonoscopy. Negative  cologuard 2021 . Does not have pap smear opr any more pelvic exams. Recommend follow-up with primary care physician and other specialist.  Received 2 doses of COVID vaccine, [place]. Not received booster , encouraged.   Has not received flu vaccine, idania got it    Return to clinic June 2022  or earlier if new symptoms.     [address]

## 2022-02-24 RX ORDER — LETROZOLE 2.5 MG/1
TABLET, FILM COATED ORAL
Qty: 90 TABLET | Refills: 0 | Status: SHIPPED | OUTPATIENT
Start: 2022-02-24 | End: 2022-05-10

## 2022-05-10 RX ORDER — LETROZOLE 2.5 MG/1
TABLET, FILM COATED ORAL
Qty: 90 TABLET | Refills: 0 | Status: SHIPPED | OUTPATIENT
Start: 2022-05-10 | End: 2022-10-04

## 2022-06-20 ENCOUNTER — TELEPHONE (OUTPATIENT)
Dept: ONCOLOGY | Age: 67
End: 2022-06-20

## 2022-06-20 NOTE — TELEPHONE ENCOUNTER
Pt called asking for lab orders to be sent over to [facility] in [place]. Orders for Cancer Antigen, CMP, and CBC ordered 12/30/21 faxed to [phone]. Confirmation rec'd.

## 2022-06-29 ENCOUNTER — OFFICE VISIT (OUTPATIENT)
Dept: ONCOLOGY | Age: 67
End: 2022-06-29
Payer: MEDICARE

## 2022-06-29 ENCOUNTER — HOSPITAL ENCOUNTER (OUTPATIENT)
Dept: INFUSION THERAPY | Age: 67
Discharge: HOME | End: 2022-06-29

## 2022-06-29 VITALS
TEMPERATURE: 97.1 F | SYSTOLIC BLOOD PRESSURE: 148 MMHG | BODY MASS INDEX: 22.96 KG/M2 | DIASTOLIC BLOOD PRESSURE: 67 MMHG | HEIGHT: 63 IN | HEART RATE: 80 BPM | OXYGEN SATURATION: 99 % | WEIGHT: 129.6 LBS

## 2022-06-29 DIAGNOSIS — Z78.0 MENOPAUSE: ICD-10-CM

## 2022-06-29 DIAGNOSIS — R91.1 LUNG NODULE: ICD-10-CM

## 2022-06-29 DIAGNOSIS — M81.0 AGE-RELATED OSTEOPOROSIS WITHOUT CURRENT PATHOLOGICAL FRACTURE: ICD-10-CM

## 2022-06-29 DIAGNOSIS — C50.912 INVASIVE DUCTAL CARCINOMA OF BREAST, FEMALE, LEFT (HCC): Primary | ICD-10-CM

## 2022-06-29 DIAGNOSIS — Z12.31 SCREENING MAMMOGRAM FOR HIGH-RISK PATIENT: ICD-10-CM

## 2022-06-29 PROCEDURE — G8399 PT W/DXA RESULTS DOCUMENT: HCPCS | Performed by: INTERNAL MEDICINE

## 2022-06-29 PROCEDURE — 3017F COLORECTAL CA SCREEN DOC REV: CPT | Performed by: INTERNAL MEDICINE

## 2022-06-29 PROCEDURE — 99214 OFFICE O/P EST MOD 30 MIN: CPT | Performed by: INTERNAL MEDICINE

## 2022-06-29 PROCEDURE — G8420 CALC BMI NORM PARAMETERS: HCPCS | Performed by: INTERNAL MEDICINE

## 2022-06-29 PROCEDURE — 1036F TOBACCO NON-USER: CPT | Performed by: INTERNAL MEDICINE

## 2022-06-29 PROCEDURE — 1123F ACP DISCUSS/DSCN MKR DOCD: CPT | Performed by: INTERNAL MEDICINE

## 2022-06-29 PROCEDURE — G8427 DOCREV CUR MEDS BY ELIG CLIN: HCPCS | Performed by: INTERNAL MEDICINE

## 2022-06-29 PROCEDURE — 1090F PRES/ABSN URINE INCON ASSESS: CPT | Performed by: INTERNAL MEDICINE

## 2022-06-29 ASSESSMENT — PATIENT HEALTH QUESTIONNAIRE - PHQ9
SUM OF ALL RESPONSES TO PHQ QUESTIONS 1-9: 0
SUM OF ALL RESPONSES TO PHQ QUESTIONS 1-9: 0
SUM OF ALL RESPONSES TO PHQ9 QUESTIONS 1 & 2: 0
1. LITTLE INTEREST OR PLEASURE IN DOING THINGS: 0
2. FEELING DOWN, DEPRESSED OR HOPELESS: 0
SUM OF ALL RESPONSES TO PHQ QUESTIONS 1-9: 0
SUM OF ALL RESPONSES TO PHQ QUESTIONS 1-9: 0

## 2022-06-29 NOTE — PROGRESS NOTES
Patient Name: Elsa Keen  Patient : 1955  Patient MRN: [id number]     Primary Oncologist: Edna Pérez MD  Referring Physician: LIZBETH Ricketts ([facility]), Love Somers MD, Lina De Los Santos MD       Date of Service: 2022      Chief Complaint:   Chief Complaint   Patient presents with    Follow-up        Active Problem list  1. Invasive ductal carcinoma of breast, female, left ([address]    2. Age-related osteoporosis without current pathological fracture    3. Lung nodule    4. Screening mammogram for high-risk patient    5. Menopause           HPI:        69-year-old female history of T2 N0 M0,ER [place] HER-2 positive infiltrating ductal carcinoma of the left breast status post lumpectomy sentinel lymph node biopsy on 2015 By Dr. Dinora Singer. She was treated with adjuvant Taxotere carboplatin and Herceptin for 6 cycles followed by Herceptin for total one year until 2016. Since she has completed radiation, by Dr Tierney Crowell, Receiving a total dose of 45 gray To the whole breast and 61 gray to the lumpectomy cavity boost. and has been on AI since 2016. She has osteoporosis she is taking calcium vitamin D.   2015 she had a T score -2.1 left femoral trochanter noted to be osteoporotic. 2015 EF 60%  2017 mammogram negative  2017 her CA 27.29 was 21.9. Vitamin D was 40   2017 status post Zometa ×1   She is compliant with her AI. And for the most part has no side effects. He does report though that over the last 4-5 months she does have some left chest wall axillary aches and pains. It is definitely related to when she was picking things up that are heavy. She has not noticed any lymphadenopathy lumps or bumps or weakness or paresthesias or swelling. These symptoms have not gotten worse. And as mentioned she definitely notices a relationship to this in regards to her working.    2019: Dexa scan:IMPRESSION:  Osteopenia by WHO criteria.      : Mammogram:IMPRESSION:  Coarse appearing dystrophic calcifications are developing in the lumpectomy  bed in the lateral left breast. These have benign features on the  magnification views and routine annual screening mammography is recommended. BIRADS:  BIRADS - CATEGORY 2    12/10/2020 [place] [address] 925 CA 15-3 34 CBC and CMP within normal limits    21:Mammogram :  Single view asymmetry in the medial left breast for which further evaluation   with compression views and possible ultrasound is recommended.       BIRADS:   BIRADS - CATEGORY 0       Incomplete: Needs Additional Imaging Evaluation     2021: Diagnostic mammogram  Benign diagnostic left mammogram.  Routine screening mammogram is recommended.       BIRADS:   BIRADS - CATEGORY 1       Negative.  Normal interval follow-up is recommended in 12 months.       OVERALL ASSESSMENT - NEGATIVE     2021: CT lung screenin. No suspicious pulmonary nodules. 2. Sequelae of granulomatous disease. 3. Minimal centrilobular and paraseptal emphysema. 4. Similar appearance of suspected subpleural bronchiolectasis with right   lower lobe predominance, potentially sequelae of interstitial fibrosis of   indeterminate pattern. 5. Minimal to mild coronary atherosclerotic calcifications. 2021 Dexa scan with osteopenia. 2022 CMP with sodium of 143 potassium of 3.9 BUN 18 creatinine 0.7 rest of the LFTs within normal limits CA 27-29 was 29.7 CBC with WBC of 7.2 hemoglobin of 13 hematocrit 43.0 MCV of 98.2 differential within normal limits. Interval History  2022: Arrived Alone to the clinic today. Reported that she is compliant and Tolerating AI fairly well. No hot flashes, new  muscle pains,new joint pains     Overall feel great. Denied any chest pain, increase shortness of breath, palpitations or dizziness. Denied any abdominal pain, nausea, vomiting, diarrhea, constipation.   Denied any headaches or vision changes or any focal weakness. Smoking 3 cig per day. Gained 4 lbs since last visit. Review of Systems   Per interval history; otherwise 10 point ROS is negative              Vital Signs:  BP (!) 148/67 (Site: Right Upper Arm, Position: Sitting, Cuff Size: Medium Adult)   Pulse 80   Temp 97.1 °F (36.2 °C) (Infrared)   Ht 5' 3\" (1.6 m)   Wt 129 lb 9.6 oz (58.8 kg)   SpO2 99%   BMI 22.96 kg/m²     Physical Exam:    CONSTITUTIONAL: awake, alert, obese  EYES: ANTIONETTE, no palor or any icetrus  ENT: ATNC  NECK: No JVD, no lad  HEMATOLOGIC/LYMPHATIC: no cervical, supraclavicular or axillary lymphadenopathy   LUNGS: CTAB  CARDIOVASCULAR: rrr s1s2   ABDOMEN: soft ntnd bs pos  NEUROLOGIC:GI  SKIN: No rash  EXTREMITIES: no LE edema bilaterally  Breast: Rt breast no masses, no axillary lad bilaterally , left breast lumpectomy changes. Underlying fibrosis, no discrete masses, no erythema, no nipple discharge but a scab.      Labs:    Hematology:  Lab Results   Component Value Date    WBC 5.9 05/02/2018    RBC 4.60 05/02/2018    HGB 14.7 05/02/2018    HCT 45.1 05/02/2018    MCV 98.0 05/02/2018    MCH 32.0 (H) 05/02/2018    MCHC 32.6 05/02/2018    RDW 13.2 05/02/2018     05/02/2018    MPV 10.2 05/02/2018    SEGSPCT 56.6 05/02/2018    EOSRELPCT 2.5 05/02/2018    BASOPCT 0.7 05/02/2018    LYMPHOPCT 29.7 05/02/2018    MONOPCT 10.2 (H) 05/02/2018    SEGSABS 3.3 05/02/2018    EOSABS 0.2 05/02/2018    BASOSABS 0.0 05/02/2018    LYMPHSABS 1.8 05/02/2018    MONOSABS 0.6 05/02/2018    DIFFTYPE AUTOMATED DIFFERENTIAL 05/02/2018     Lab Results   Component Value Date    ESR 10 05/02/2018       Chemistry:  Lab Results   Component Value Date     10/15/2018    K 4.2 10/15/2018     10/15/2018    CO2 25 10/15/2018    BUN 20 10/15/2018    CREATININE 0.7 10/15/2018    GLUCOSE 86 10/15/2018    CALCIUM 9.2 10/15/2018    PROT 6.8 10/15/2018    LABALBU 4.3 10/15/2018    BILITOT 0.3 10/15/2018    ALKPHOS 61 10/15/2018    AST 16 10/15/2018 ALT 18 10/15/2018    LABGLOM >60 10/15/2018    GFRAA >60 10/15/2018     No results found for: MMA, LDH, HOMOCYSTEINE  No components found for: LD  Lab Results   Component Value Date    TSHHS 0.826 06/24/2011       Immunology:  Lab Results   Component Value Date    PROT 6.8 10/15/2018     No results found for: Wil Mccoy, KLFLCR  No results found for: B2M    Coagulation Panel:  No results found for: PROTIME, INR, APTT, DDIMER    Anemia Panel:  No results found for: [id number], FOLATE    Tumor Markers:  Lab Results   Component Value Date    LABCA2 19.4 01/03/2018         Imaging: Reviewed     Pathology:Reviewed     Observations:  Performance Status: ECOG 0  Depression Status: PHQ-9 Total Score: 0 (6/29/2022  9:42 AM)          Assessment & Plan:   71-year-old female history of a T2 N0 M0 ER IN positive HER-2 positive infiltrating ductal carcinoma left breast status post lumpectomy sentinel lymph node sep 2015. Status post adjuvant Taxotere carboplatin and Herceptin ×6. Status post radiation  June 2016. Is on AI since June 2016, plan to continue for 10 years, till 2026  Dexa scan August 2021  with osteopenia. Continue vitamin D and calcium supplements. Exercise as tolerated  Mammogram June 2021 with left breast asymmetry, ultrasound neg. Repeat diagnostic mammogram pending   Ca 27-29 in June 2022 was 29    6 mm right lower lobe lung nodule: Repeat Ct chest June 2021 with no nodules. Recommend smoking cessation. Repeat CT chest July 2022 pending    HTN: Systolic blood pressure remains between 145 and 3. She is compliant with her antihypertensive. Recommend that she maintains a log and discuss with primary care physician if remains high. Recommend healthy low-salt diet and weight loss if possible. Continue other medical care. Discussed above findings and plan with her and she verbalized understanding.     Discussed healthy lifestyle including smoking cessation, healthy diet and regular exercise as tolerated. Also discussed importance of being up-to-date with age-appropriate screening tools. Never had colonoscopy. Negative  cologuard 2021, 2022 pending . Does not have pap smear opr any more pelvic exams. Recommend follow-up with primary care physician and other specialist.    Return to clinic Jan 2023 or earlier if new symptoms.     [address]

## 2022-06-29 NOTE — PROGRESS NOTES
MA Rooming Questions  Patient: Balbir Ho  MRN: [id number]    Date: 6/29/2022        1. Do you have any new issues?   no         2. Do you need any refills on medications?    no    3. Have you had any imaging done since your last visit?   no    4. Have you been hospitalized or seen in the emergency room since your last visit here?   no    5. Did the patient have a depression screening completed today?  Yes    PHQ-9 Total Score: 0 (6/29/2022  9:42 AM)       PHQ-9 Given to (if applicable):               PHQ-9 Score (if applicable):                     [] Positive     [x]  Negative              Does question #9 need addressed (if applicable)                     [] Yes    []  No               Dung Palomino CMA

## 2022-07-01 ENCOUNTER — HOSPITAL ENCOUNTER (OUTPATIENT)
Dept: CT IMAGING | Age: 67
Discharge: HOME | End: 2022-07-01
Payer: MEDICARE

## 2022-07-01 DIAGNOSIS — R91.1 LUNG NODULE: ICD-10-CM

## 2022-07-01 DIAGNOSIS — C50.912 INVASIVE DUCTAL CARCINOMA OF BREAST, FEMALE, LEFT (HCC): ICD-10-CM

## 2022-07-01 PROCEDURE — 71271 CT THORAX LUNG CANCER SCR C-: CPT

## 2022-07-06 DIAGNOSIS — Z12.31 SCREENING MAMMOGRAM FOR HIGH-RISK PATIENT: Primary | ICD-10-CM

## 2022-07-21 ENCOUNTER — APPOINTMENT (OUTPATIENT)
Dept: ULTRASOUND IMAGING | Age: 67
End: 2022-07-21
Payer: MEDICARE

## 2022-07-21 ENCOUNTER — HOSPITAL ENCOUNTER (OUTPATIENT)
Dept: MAMMOGRAPHY | Age: 67
Discharge: HOME | End: 2022-07-21
Payer: MEDICARE

## 2022-07-21 DIAGNOSIS — Z12.31 SCREENING MAMMOGRAM, ENCOUNTER FOR: ICD-10-CM

## 2022-07-21 PROCEDURE — 77063 BREAST TOMOSYNTHESIS BI: CPT

## 2022-10-04 RX ORDER — LETROZOLE 2.5 MG/1
TABLET, FILM COATED ORAL
Qty: 90 TABLET | Refills: 0 | Status: SHIPPED | OUTPATIENT
Start: 2022-10-04

## 2023-01-05 ENCOUNTER — OFFICE VISIT (OUTPATIENT)
Dept: ONCOLOGY | Age: 68
End: 2023-01-05

## 2023-01-05 ENCOUNTER — HOSPITAL ENCOUNTER (OUTPATIENT)
Dept: INFUSION THERAPY | Age: 68
Discharge: HOME | End: 2023-01-05
Payer: MEDICARE

## 2023-01-05 VITALS
TEMPERATURE: 97.9 F | HEIGHT: 63 IN | WEIGHT: 135.2 LBS | OXYGEN SATURATION: 97 % | SYSTOLIC BLOOD PRESSURE: 148 MMHG | DIASTOLIC BLOOD PRESSURE: 65 MMHG | BODY MASS INDEX: 23.96 KG/M2 | HEART RATE: 87 BPM

## 2023-01-05 DIAGNOSIS — Z12.31 OTHER SCREENING MAMMOGRAM: Primary | ICD-10-CM

## 2023-01-05 DIAGNOSIS — R91.1 LUNG NODULE: ICD-10-CM

## 2023-01-05 DIAGNOSIS — C50.912 INVASIVE DUCTAL CARCINOMA OF BREAST, FEMALE, LEFT (HCC): ICD-10-CM

## 2023-01-05 PROCEDURE — 99211 OFF/OP EST MAY X REQ PHY/QHP: CPT

## 2023-01-05 ASSESSMENT — PATIENT HEALTH QUESTIONNAIRE - PHQ9
SUM OF ALL RESPONSES TO PHQ QUESTIONS 1-9: 0
SUM OF ALL RESPONSES TO PHQ9 QUESTIONS 1 & 2: 0
SUM OF ALL RESPONSES TO PHQ QUESTIONS 1-9: 0
1. LITTLE INTEREST OR PLEASURE IN DOING THINGS: 0
2. FEELING DOWN, DEPRESSED OR HOPELESS: 0

## 2023-01-05 NOTE — PROGRESS NOTES
Patient Name: Jacquelyn Holder  Patient : 1955  Patient MRN: [id number]     Primary Oncologist: Nicolasa Brcue MD  Referring Physician: LIZBETH Tidwell ([facility]), Michael Amaro MD, Yao Chicas MD       Date of Service: 2023      Chief Complaint:   Chief Complaint   Patient presents with    Follow-up          Active Problem list  1. Other screening mammogram    2. Invasive ductal carcinoma of breast, female, left ([address]    3. Lung nodule           HPI:        68-year-old female history of T2 N0 M0,ER [place] HER-2 positive infiltrating ductal carcinoma of the left breast status post lumpectomy sentinel lymph node biopsy on 2015 By Dr. Rayna Han. She was treated with adjuvant Taxotere carboplatin and Herceptin for 6 cycles followed by Herceptin for total one year until 2016. Since she has completed radiation, by Dr Jen Saha, Receiving a total dose of 45 gray To the whole breast and 61 gray to the lumpectomy cavity boost. and has been on AI since 2016. She has osteoporosis she is taking calcium vitamin D.   2015 she had a T score -2.1 left femoral trochanter noted to be osteoporotic. 2015 EF 60%  2017 mammogram negative  2017 her CA 27.29 was 21.9. Vitamin D was 40   2017 status post Zometa ×1   She is compliant with her AI. And for the most part has no side effects. He does report though that over the last 4-5 months she does have some left chest wall axillary aches and pains. It is definitely related to when she was picking things up that are heavy. She has not noticed any lymphadenopathy lumps or bumps or weakness or paresthesias or swelling. These symptoms have not gotten worse. And as mentioned she definitely notices a relationship to this in regards to her working.    2019: Dexa scan:IMPRESSION:  Osteopenia by WHO criteria.      : Mammogram:IMPRESSION:  Coarse appearing dystrophic calcifications are developing in the lumpectomy  bed in the lateral left breast. These have benign features on the  magnification views and routine annual screening mammography is recommended. BIRADS:  BIRADS - CATEGORY 2    12/10/2020 [place] [id number] 925 CA 15-3 34 CBC and CMP within normal limits    21:Mammogram :  Single view asymmetry in the medial left breast for which further evaluation   with compression views and possible ultrasound is recommended. BIRADS:   BIRADS - CATEGORY 0       Incomplete: Needs Additional Imaging Evaluation     2021: Diagnostic mammogram  Benign diagnostic left mammogram.  Routine screening mammogram is recommended. BIRADS:   BIRADS - CATEGORY 1       Negative. Normal interval follow-up is recommended in 12 months. OVERALL ASSESSMENT - NEGATIVE     2021: CT lung screenin. No suspicious pulmonary nodules. 2. Sequelae of granulomatous disease. 3. Minimal centrilobular and paraseptal emphysema. 4. Similar appearance of suspected subpleural bronchiolectasis with right   lower lobe predominance, potentially sequelae of interstitial fibrosis of   indeterminate pattern. 5. Minimal to mild coronary atherosclerotic calcifications. 2021 Dexa scan with osteopenia. 2022 CMP with sodium of 143 potassium of 3.9 BUN 18 creatinine 0.7 rest of the LFTs within normal limits CA 27-29 was 29.7 CBC with WBC of 7.2 hemoglobin of 13 hematocrit 43.0 MCV of 98.2 differential within normal limits. 22:mammogram:  No mammographic evidence of malignancy. BIRADS:   BIRADS - CATEGORY 2       Benign Findings. Normal interval follow-up is recommended in 12 months. OVERALL ASSESSMENT - BENIGN       2022:  No suspicious pulmonary nodule. Stable pulmonary fibrosis. LUNG RADS:   Per ACR Lung-RADS Version 1.1       Category 1, Negative (No nodules and definitely benign nodules). Management: Continue annual lung screening with LDCT in 12 months.      Interval History  2023: Arrived alone to the clinic today. Feels great. No breast masses or any aches or lymphadenopathy. No chest pain. No abdominal pain. No weight loss. No headache or vision changes. Review of Systems   Per interval history; otherwise 10 point ROS is negative              Vital Signs:  BP (!) 148/65 (Site: Right Upper Arm, Position: Sitting, Cuff Size: Medium Adult)   Pulse 87   Temp 97.9 °F (36.6 °C) (Temporal)   Ht 5' 3\" (1.6 m)   Wt 135 lb 3.2 oz (61.3 kg)   SpO2 97%   BMI 23.95 kg/m²     Physical Exam:    CONSTITUTIONAL: awake, alert, obese  EYES: ANTIONETTE, no palor or any icetrus  ENT: ATNC  NECK: No JVD, no lad  HEMATOLOGIC/LYMPHATIC: no cervical, supraclavicular or axillary lymphadenopathy   LUNGS: CTAB  CARDIOVASCULAR: rrr s1s2   ABDOMEN: soft ntnd bs pos  NEUROLOGIC:GI  SKIN: No rash  EXTREMITIES: no LE edema bilaterally  Breast: Rt breast no masses, no axillary lad bilaterally , left breast lumpectomy changes. Underlying fibrosis, no discrete masses, no erythema, no nipple discharge but a scab.      Labs:    Hematology:  Lab Results   Component Value Date    WBC 5.9 05/02/2018    RBC 4.60 05/02/2018    HGB 14.7 05/02/2018    HCT 45.1 05/02/2018    MCV 98.0 05/02/2018    MCH 32.0 (H) 05/02/2018    MCHC 32.6 05/02/2018    RDW 13.2 05/02/2018     05/02/2018    MPV 10.2 05/02/2018    SEGSPCT 56.6 05/02/2018    EOSRELPCT 2.5 05/02/2018    BASOPCT 0.7 05/02/2018    LYMPHOPCT 29.7 05/02/2018    MONOPCT 10.2 (H) 05/02/2018    SEGSABS 3.3 05/02/2018    EOSABS 0.2 05/02/2018    BASOSABS 0.0 05/02/2018    LYMPHSABS 1.8 05/02/2018    MONOSABS 0.6 05/02/2018    DIFFTYPE AUTOMATED DIFFERENTIAL 05/02/2018     Lab Results   Component Value Date    ESR 10 05/02/2018       Chemistry:  Lab Results   Component Value Date     10/15/2018    K 4.2 10/15/2018     10/15/2018    CO2 25 10/15/2018    BUN 20 10/15/2018    CREATININE 0.7 10/15/2018    GLUCOSE 86 10/15/2018    CALCIUM 9.2 10/15/2018    PROT 6.8 10/15/2018    LABALBU 4.3 10/15/2018    BILITOT 0.3 10/15/2018    ALKPHOS 61 10/15/2018    AST 16 10/15/2018    ALT 18 10/15/2018    LABGLOM >60 10/15/2018    GFRAA >60 10/15/2018     No results found for: MMA, LDH, HOMOCYSTEINE  No components found for: LD  Lab Results   Component Value Date    TSHHS 0.826 06/24/2011       Immunology:  Lab Results   Component Value Date    PROT 6.8 10/15/2018     No results found for: Felix Mary Jane, KLFLCR  No results found for: B2M    Coagulation Panel:  No results found for: PROTIME, INR, APTT, DDIMER    Anemia Panel:  No results found for: [id number], FOLATE    Tumor Markers:  Lab Results   Component Value Date    LABCA2 19.4 01/03/2018         Imaging: Reviewed     Pathology:Reviewed     Observations:  Performance Status: ECOG 0  Depression Status: PHQ-9 Total Score: 0 (1/5/2023  9:09 AM)          Assessment & Plan:   58-year-old female history of a T2 N0 M0 ER [place] positive HER-2 positive infiltrating ductal carcinoma left breast status post lumpectomy sentinel lymph node sep 2015. Status post adjuvant Taxotere carboplatin and Herceptin ×6. Status post radiation  June 2016. Is on AI since June 2016, plan to continue for 10 years, till 2026  Dexa scan August 2021  with osteopenia. Continue vitamin D and calcium supplements. Exercise as tolerated  Mammogram July 2022 with no mammographic evidence of malignancy. Will follow per guidelines. 6 mm right lower lobe lung nodule: CT chest July 2022 with no suspicious pulmonary nodule. Continue yearly CT chest.    HTN:She is compliant with her antihypertensive. Recommend that she maintains a log and discuss with primary care physician if remains high. Recommend healthy low-salt diet and weight loss if possible. Continue other medical care. Discussed above findings and plan with her and she verbalized understanding.     Discussed healthy lifestyle including smoking cessation, healthy diet and regular exercise as tolerated. Also discussed importance of being up-to-date with age-appropriate screening tools. Never had colonoscopy. Negative  cologuard 2021, recommend yearly. Does not have pap smear opr any more pelvic exams. Recommend follow-up with primary care physician and other specialist.    Return to clinic July 2023 or earlier if new symptoms.     [address]

## 2023-01-05 NOTE — PROGRESS NOTES
MA Rooming Questions  Patient: Bhavna Perez  MRN: [id number]    Date: 1/5/2023        1. Do you have any new issues?   no         2. Do you need any refills on medications?    no    3. Have you had any imaging done since your last visit? yes - AT [address]    4. Have you been hospitalized or seen in the emergency room since your last visit here?   no    5. Did the patient have a depression screening completed today?  Yes    PHQ-9 Total Score: 0 (1/5/2023  9:09 AM)       PHQ-9 Given to (if applicable):               PHQ-9 Score (if applicable):                     [] Positive     []  Negative              Does question #9 need addressed (if applicable)                     [] Yes    []  No               Stephanie Laurent CMA

## 2023-01-06 DIAGNOSIS — F17.200 SMOKER: Primary | ICD-10-CM

## 2023-01-17 DIAGNOSIS — Z12.31 ENCOUNTER FOR SCREENING MAMMOGRAM FOR BREAST CANCER: ICD-10-CM

## 2023-01-17 DIAGNOSIS — Z12.39 BREAST CANCER SCREENING, HIGH RISK PATIENT: Primary | ICD-10-CM

## 2023-01-17 NOTE — PROGRESS NOTES
Per Dr. Brianna Johnson - order placed for bilateral screening mammogram per protocol to be done in July 2023.

## 2023-03-10 RX ORDER — LETROZOLE 2.5 MG/1
TABLET, FILM COATED ORAL
Qty: 90 TABLET | Refills: 0 | Status: SHIPPED | OUTPATIENT
Start: 2023-03-10

## 2023-07-31 ENCOUNTER — HOSPITAL ENCOUNTER (OUTPATIENT)
Dept: CT IMAGING | Age: 68
Discharge: HOME | End: 2023-07-31
Attending: INTERNAL MEDICINE
Payer: MEDICARE

## 2023-07-31 ENCOUNTER — HOSPITAL ENCOUNTER (OUTPATIENT)
Dept: WOMENS IMAGING | Age: 68
Discharge: HOME | End: 2023-07-31
Attending: INTERNAL MEDICINE
Payer: MEDICARE

## 2023-07-31 ENCOUNTER — HOSPITAL ENCOUNTER (OUTPATIENT)
Age: 68
Discharge: HOME | End: 2023-07-31
Attending: INTERNAL MEDICINE
Payer: MEDICARE

## 2023-07-31 VITALS — BODY MASS INDEX: 23.04 KG/M2 | HEIGHT: 63 IN | WEIGHT: 130 LBS

## 2023-07-31 DIAGNOSIS — Z12.31 ENCOUNTER FOR SCREENING MAMMOGRAM FOR BREAST CANCER: ICD-10-CM

## 2023-07-31 DIAGNOSIS — F17.200 SMOKER: ICD-10-CM

## 2023-07-31 DIAGNOSIS — Z12.31 OTHER SCREENING MAMMOGRAM: ICD-10-CM

## 2023-07-31 DIAGNOSIS — Z12.39 BREAST CANCER SCREENING, HIGH RISK PATIENT: ICD-10-CM

## 2023-07-31 DIAGNOSIS — C50.912 INVASIVE DUCTAL CARCINOMA OF BREAST, FEMALE, LEFT (HCC): ICD-10-CM

## 2023-07-31 DIAGNOSIS — R91.1 LUNG NODULE: ICD-10-CM

## 2023-07-31 LAB
ALBUMIN SERPL-MCNC: 4.1 GM/DL (ref 3.4–5)
ALP BLD-CCNC: 67 IU/L (ref 40–128)
ALT SERPL-CCNC: 21 U/L (ref 10–40)
ANION GAP SERPL CALCULATED.3IONS-SCNC: 12 MMOL/L (ref 4–16)
AST SERPL-CCNC: 21 IU/L (ref 15–37)
BASOPHILS ABSOLUTE: 0 K/CU MM
BASOPHILS RELATIVE PERCENT: 0.4 % (ref 0–1)
BILIRUB SERPL-MCNC: 0.4 MG/DL (ref 0–1)
BUN SERPL-MCNC: 14 MG/DL (ref 6–23)
CALCIUM SERPL-MCNC: 10.1 MG/DL (ref 8.3–10.6)
CHLORIDE BLD-SCNC: 108 MMOL/L (ref 99–110)
CO2: 26 MMOL/L (ref 21–32)
CREAT SERPL-MCNC: 0.8 MG/DL (ref 0.6–1.1)
DIFFERENTIAL TYPE: ABNORMAL
EOSINOPHILS ABSOLUTE: 0.1 K/CU MM
EOSINOPHILS RELATIVE PERCENT: 1.3 % (ref 0–3)
GFR SERPL CREATININE-BSD FRML MDRD: >60 ML/MIN/1.73M2
GLUCOSE SERPL-MCNC: 100 MG/DL (ref 70–99)
HCT VFR BLD CALC: 41.2 % (ref 37–47)
HEMOGLOBIN: 13.3 GM/DL (ref 12.5–16)
IMMATURE NEUTROPHIL %: 0.8 % (ref 0–0.43)
LYMPHOCYTES ABSOLUTE: 1.4 K/CU MM
LYMPHOCYTES RELATIVE PERCENT: 17.3 % (ref 24–44)
MCH RBC QN AUTO: 31.1 PG (ref 27–31)
MCHC RBC AUTO-ENTMCNC: 32.3 % (ref 32–36)
MCV RBC AUTO: 96.5 FL (ref 78–100)
MONOCYTES ABSOLUTE: 0.8 K/CU MM
MONOCYTES RELATIVE PERCENT: 10.3 % (ref 0–4)
NUCLEATED RBC %: 0 %
PDW BLD-RTO: 13 % (ref 11.7–14.9)
PLATELET # BLD: 253 K/CU MM (ref 140–440)
PMV BLD AUTO: 10.8 FL (ref 7.5–11.1)
POTASSIUM SERPL-SCNC: 3.1 MMOL/L (ref 3.5–5.1)
RBC # BLD: 4.27 M/CU MM (ref 4.2–5.4)
SEGMENTED NEUTROPHILS ABSOLUTE COUNT: 5.5 K/CU MM
SEGMENTED NEUTROPHILS RELATIVE PERCENT: 69.9 % (ref 36–66)
SODIUM BLD-SCNC: 146 MMOL/L (ref 135–145)
TOTAL IMMATURE NEUTOROPHIL: 0.06 K/CU MM
TOTAL NUCLEATED RBC: 0 K/CU MM
TOTAL PROTEIN: 6.6 GM/DL (ref 6.4–8.2)
WBC # BLD: 7.9 K/CU MM (ref 4–10.5)

## 2023-07-31 PROCEDURE — 80053 COMPREHEN METABOLIC PANEL: CPT

## 2023-07-31 PROCEDURE — 86300 IMMUNOASSAY TUMOR CA 15-3: CPT

## 2023-07-31 PROCEDURE — 77063 BREAST TOMOSYNTHESIS BI: CPT

## 2023-07-31 PROCEDURE — 71271 CT THORAX LUNG CANCER SCR C-: CPT

## 2023-07-31 PROCEDURE — 36415 COLL VENOUS BLD VENIPUNCTURE: CPT

## 2023-07-31 PROCEDURE — 85025 COMPLETE CBC W/AUTO DIFF WBC: CPT

## 2023-08-02 ENCOUNTER — OFFICE VISIT (OUTPATIENT)
Dept: ONCOLOGY | Age: 68
End: 2023-08-02
Payer: MEDICARE

## 2023-08-02 ENCOUNTER — HOSPITAL ENCOUNTER (OUTPATIENT)
Dept: INFUSION THERAPY | Age: 68
Discharge: HOME | End: 2023-08-02
Payer: MEDICARE

## 2023-08-02 VITALS
OXYGEN SATURATION: 99 % | DIASTOLIC BLOOD PRESSURE: 67 MMHG | RESPIRATION RATE: 16 BRPM | BODY MASS INDEX: 22.11 KG/M2 | SYSTOLIC BLOOD PRESSURE: 155 MMHG | TEMPERATURE: 97.3 F | WEIGHT: 124.8 LBS | HEIGHT: 63 IN | HEART RATE: 80 BPM

## 2023-08-02 DIAGNOSIS — C50.912 INVASIVE DUCTAL CARCINOMA OF BREAST, FEMALE, LEFT (HCC): Primary | ICD-10-CM

## 2023-08-02 DIAGNOSIS — R91.1 LUNG NODULE: ICD-10-CM

## 2023-08-02 LAB — CANCER AG27-29 SERPL-ACNC: 20.8 U/ML

## 2023-08-02 PROCEDURE — 4004F PT TOBACCO SCREEN RCVD TLK: CPT | Performed by: INTERNAL MEDICINE

## 2023-08-02 PROCEDURE — 1090F PRES/ABSN URINE INCON ASSESS: CPT | Performed by: INTERNAL MEDICINE

## 2023-08-02 PROCEDURE — 99211 OFF/OP EST MAY X REQ PHY/QHP: CPT

## 2023-08-02 PROCEDURE — 3017F COLORECTAL CA SCREEN DOC REV: CPT | Performed by: INTERNAL MEDICINE

## 2023-08-02 PROCEDURE — G8427 DOCREV CUR MEDS BY ELIG CLIN: HCPCS | Performed by: INTERNAL MEDICINE

## 2023-08-02 PROCEDURE — 99214 OFFICE O/P EST MOD 30 MIN: CPT | Performed by: INTERNAL MEDICINE

## 2023-08-02 PROCEDURE — G8399 PT W/DXA RESULTS DOCUMENT: HCPCS | Performed by: INTERNAL MEDICINE

## 2023-08-02 PROCEDURE — G8420 CALC BMI NORM PARAMETERS: HCPCS | Performed by: INTERNAL MEDICINE

## 2023-08-02 PROCEDURE — 1123F ACP DISCUSS/DSCN MKR DOCD: CPT | Performed by: INTERNAL MEDICINE

## 2023-08-02 NOTE — PROGRESS NOTES
MA Rooming Questions  Patient: Filiberto Mckee  MRN: [id number]    Date: 8/2/2023        1. Do you have any new issues?   no         2. Do you need any refills on medications?    no    3. Have you had any imaging done since your last visit? yes - mammo &  ct lung  7/31    4. Have you been hospitalized or seen in the emergency room since your last visit here?   no    5. Did the patient have a depression screening completed today?  No    No data recorded     PHQ-9 Given to (if applicable):               PHQ-9 Score (if applicable):                     [] Positive     []  Negative              Does question #9 need addressed (if applicable)                     [] Yes    []  No               Candance Kidd, MA
reviewing labs, decision making, Pre-charting, and documenting today's visit, >30 mins.      Shaan

## 2023-08-28 DIAGNOSIS — Z78.0 MENOPAUSE: Primary | ICD-10-CM

## 2023-09-08 RX ORDER — LETROZOLE 2.5 MG/1
TABLET, FILM COATED ORAL
Qty: 90 TABLET | Refills: 0 | Status: SHIPPED | OUTPATIENT
Start: 2023-09-08

## 2023-09-11 ENCOUNTER — HOSPITAL ENCOUNTER (OUTPATIENT)
Dept: MAMMOGRAPHY | Age: 68
Discharge: HOME | End: 2023-09-11
Attending: INTERNAL MEDICINE
Payer: MEDICARE

## 2023-09-11 DIAGNOSIS — Z78.0 MENOPAUSE: ICD-10-CM

## 2023-09-11 PROCEDURE — 77080 DXA BONE DENSITY AXIAL: CPT

## 2023-09-12 ENCOUNTER — CLINICAL DOCUMENTATION (OUTPATIENT)
Dept: ONCOLOGY | Age: 68
End: 2023-09-12

## 2023-09-12 NOTE — PROGRESS NOTES
This nurse called the patient @ [phone] to review bone scan  results. Patient was notified of the need to continue her calcium and Vitamin D supplements. Patient verbalized understanding and denies further needs at this time.
symmetrical

## 2024-02-07 RX ORDER — LETROZOLE 2.5 MG/1
TABLET, FILM COATED ORAL
Qty: 90 TABLET | Refills: 3 | OUTPATIENT
Start: 2024-02-07

## 2024-02-21 RX ORDER — LETROZOLE 2.5 MG/1
TABLET, FILM COATED ORAL
Qty: 90 TABLET | Refills: 3 | OUTPATIENT
Start: 2024-02-21

## 2024-02-22 DIAGNOSIS — C50.912 INVASIVE DUCTAL CARCINOMA OF BREAST, FEMALE, LEFT (HCC): Primary | ICD-10-CM

## 2024-02-22 RX ORDER — LETROZOLE 2.5 MG/1
2.5 TABLET, FILM COATED ORAL DAILY
Qty: 90 TABLET | Refills: 3 | Status: SHIPPED | OUTPATIENT
Start: 2024-02-22 | End: 2025-02-16

## 2024-02-22 NOTE — TELEPHONE ENCOUNTER
Patient needing refills of her Letrozole 2.5 mg one tab daily.  Per Dr. Calderon - #90 with 3 refills e-scripted to [facility].  Patient has an office visit in August 2024

## 2024-06-15 ENCOUNTER — HOSPITAL ENCOUNTER (EMERGENCY)
Age: 69
Discharge: HOME | End: 2024-06-15
Attending: EMERGENCY MEDICINE
Payer: MEDICARE

## 2024-06-15 VITALS
SYSTOLIC BLOOD PRESSURE: 141 MMHG | DIASTOLIC BLOOD PRESSURE: 51 MMHG | BODY MASS INDEX: 23.11 KG/M2 | HEIGHT: 63 IN | RESPIRATION RATE: 16 BRPM | WEIGHT: 130.4 LBS | TEMPERATURE: 98.4 F | OXYGEN SATURATION: 98 % | HEART RATE: 79 BPM

## 2024-06-15 DIAGNOSIS — H92.01 OTALGIA OF RIGHT EAR: Primary | ICD-10-CM

## 2024-06-15 PROCEDURE — 6370000000 HC RX 637 (ALT 250 FOR IP): Performed by: EMERGENCY MEDICINE

## 2024-06-15 PROCEDURE — 99283 EMERGENCY DEPT VISIT LOW MDM: CPT

## 2024-06-15 RX ORDER — OXYCODONE HYDROCHLORIDE AND ACETAMINOPHEN 5; 325 MG/1; MG/1
1 TABLET ORAL EVERY 6 HOURS PRN
Qty: 12 TABLET | Refills: 0 | Status: SHIPPED | OUTPATIENT
Start: 2024-06-15 | End: 2024-06-18

## 2024-06-15 RX ORDER — LIDOCAINE HYDROCHLORIDE 20 MG/ML
15 SOLUTION OROPHARYNGEAL ONCE
Status: DISCONTINUED | OUTPATIENT
Start: 2024-06-15 | End: 2024-06-15 | Stop reason: HOSPADM

## 2024-06-15 RX ORDER — NAPROXEN 500 MG/1
500 TABLET ORAL 2 TIMES DAILY WITH MEALS
Qty: 20 TABLET | Refills: 0 | Status: SHIPPED | OUTPATIENT
Start: 2024-06-15 | End: 2024-06-25

## 2024-06-15 RX ORDER — OFLOXACIN 3 MG/ML
4 SOLUTION/ DROPS OPHTHALMIC 4 TIMES DAILY
Qty: 10 ML | Refills: 0 | Status: SHIPPED | OUTPATIENT
Start: 2024-06-15 | End: 2024-06-25

## 2024-06-15 ASSESSMENT — PAIN - FUNCTIONAL ASSESSMENT: PAIN_FUNCTIONAL_ASSESSMENT: NONE - DENIES PAIN

## 2024-06-15 NOTE — ED PROVIDER NOTES
Triage Chief Complaint:   Otalgia (Pt arrives to the ED with a sudden onset of right ear pain while sleeping. Pt stated that she felt something crawling in it. Attempted to remove it with a q-tip and only got out blood. )    [place]:  Dianne Espana is a 69 y.o. female that presents with concern of a bug crawling into her ear.  Patient awoke to a sensation of crawling and fluttering in her right ear.  Patient shoved a Q-tip and and thinks she killed what ever it was.  Patient is having decreased hearing in the right ear and some ear pain is currently moderate.  Patient denies any other injury.  Patient did see some blood on the Q-tip but there is been no further bleeding from the ear.  No anticoagulation use.  Patient otherwise was doing well.    ROS:  General:  No fevers, no chills  ENT: No sore throat, no runny nose, + right-sided earache  Cardiovascular:  No chest pain, no palpitations  Respiratory:  No shortness of breath, no cough  Gastrointestinal:  No pain, no nausea, no vomiting, no diarrhea  : No pain with urinating, no increased frequency urinating  Neurologic:  No numbness, no weakness  Extremities:  No edema, no pain  Skin:  No rash  Psych: No axienty    Past Medical History:   Diagnosis Date    Breast cancer (HCC)     History of external beam radiation therapy 2015    Left Breast 6,100 cGy per  at [facility]    History of therapeutic radiation     Hx antineoplastic chemo     Hyperlipidemia     Hypertension      Past Surgical History:   Procedure Laterality Date    BREAST BIOPSY Left 04/2015    BREAST LUMPECTOMY Left 05/2015    ELBOW SURGERY      KNEE SURGERY Right     TUNNELED VENOUS PORT PLACEMENT  2015    [facility]     Family History   Problem Relation Age of Onset    Breast Cancer Neg Hx     Ovarian Cancer Neg Hx      Social History     Socioeconomic History    Marital status:      Spouse name: Not on file    Number of children: Not on file    Years of education: Not on file    Highest

## 2024-08-08 ENCOUNTER — HOSPITAL ENCOUNTER (OUTPATIENT)
Dept: MAMMOGRAPHY | Age: 69
Discharge: HOME | End: 2024-08-08
Payer: MEDICARE

## 2024-08-08 VITALS — HEIGHT: 63 IN | WEIGHT: 130 LBS | BODY MASS INDEX: 23.04 KG/M2

## 2024-08-08 DIAGNOSIS — Z12.31 SCREENING MAMMOGRAM, ENCOUNTER FOR: ICD-10-CM

## 2024-08-08 DIAGNOSIS — C50.912 INVASIVE DUCTAL CARCINOMA OF BREAST, FEMALE, LEFT (HCC): Primary | ICD-10-CM

## 2024-08-08 PROCEDURE — 77063 BREAST TOMOSYNTHESIS BI: CPT

## 2024-08-09 ENCOUNTER — HOSPITAL ENCOUNTER (OUTPATIENT)
Age: 69
Discharge: HOME | End: 2024-08-09
Payer: MEDICARE

## 2024-08-09 DIAGNOSIS — C50.912 INVASIVE DUCTAL CARCINOMA OF BREAST, FEMALE, LEFT (HCC): ICD-10-CM

## 2024-08-09 LAB
ALBUMIN SERPL-MCNC: 4 GM/DL (ref 3.4–5)
ALP BLD-CCNC: 64 IU/L (ref 40–129)
ALT SERPL-CCNC: 16 U/L (ref 10–40)
ANION GAP SERPL CALCULATED.3IONS-SCNC: 13 MMOL/L (ref 7–16)
AST SERPL-CCNC: 16 IU/L (ref 15–37)
BASOPHILS ABSOLUTE: 0 K/CU MM
BASOPHILS RELATIVE PERCENT: 0.5 % (ref 0–1)
BILIRUB SERPL-MCNC: 0.3 MG/DL (ref 0–1)
BUN SERPL-MCNC: 16 MG/DL (ref 6–23)
CALCIUM SERPL-MCNC: 9.3 MG/DL (ref 8.3–10.6)
CHLORIDE BLD-SCNC: 106 MMOL/L (ref 99–110)
CO2: 23 MMOL/L (ref 21–32)
CREAT SERPL-MCNC: 0.5 MG/DL (ref 0.6–1.1)
DIFFERENTIAL TYPE: ABNORMAL
EOSINOPHILS ABSOLUTE: 0.2 K/CU MM
EOSINOPHILS RELATIVE PERCENT: 2.6 % (ref 0–3)
GFR, ESTIMATED: >90 ML/MIN/1.73M2
GLUCOSE SERPL-MCNC: 91 MG/DL (ref 70–99)
HCT VFR BLD CALC: 44 % (ref 37–47)
HEMOGLOBIN: 14.4 GM/DL (ref 12.5–16)
IMMATURE NEUTROPHIL %: 0.4 % (ref 0–0.43)
LYMPHOCYTES ABSOLUTE: 1.8 K/CU MM
LYMPHOCYTES RELATIVE PERCENT: 23.9 % (ref 24–44)
MCH RBC QN AUTO: 31.5 PG (ref 27–31)
MCHC RBC AUTO-ENTMCNC: 32.7 % (ref 32–36)
MCV RBC AUTO: 96.3 FL (ref 78–100)
MONOCYTES ABSOLUTE: 0.7 K/CU MM
MONOCYTES RELATIVE PERCENT: 8.6 % (ref 0–4)
NEUTROPHILS ABSOLUTE: 4.9 K/CU MM
NEUTROPHILS RELATIVE PERCENT: 64 % (ref 36–66)
PDW BLD-RTO: 13.5 % (ref 11.7–14.9)
PLATELET # BLD: 214 K/CU MM (ref 140–440)
PMV BLD AUTO: 10.3 FL (ref 7.5–11.1)
POTASSIUM SERPL-SCNC: 4.2 MMOL/L (ref 3.5–5.1)
RBC # BLD: 4.57 M/CU MM (ref 4.2–5.4)
SODIUM BLD-SCNC: 142 MMOL/L (ref 135–145)
TOTAL IMMATURE NEUTOROPHIL: 0.03 K/CU MM
TOTAL PROTEIN: 7.2 GM/DL (ref 6.4–8.2)
WBC # BLD: 7.7 K/CU MM (ref 4–10.5)

## 2024-08-09 PROCEDURE — 86300 IMMUNOASSAY TUMOR CA 15-3: CPT

## 2024-08-09 PROCEDURE — 85025 COMPLETE CBC W/AUTO DIFF WBC: CPT

## 2024-08-09 PROCEDURE — 36415 COLL VENOUS BLD VENIPUNCTURE: CPT

## 2024-08-09 PROCEDURE — 80053 COMPREHEN METABOLIC PANEL: CPT

## 2024-08-11 LAB — CANCER AG27-29 SERPL-ACNC: 23.7 U/ML

## 2024-08-23 ENCOUNTER — HOSPITAL ENCOUNTER (OUTPATIENT)
Dept: INFUSION THERAPY | Age: 69
Discharge: HOME | End: 2024-08-23
Payer: MEDICARE

## 2024-08-23 ENCOUNTER — OFFICE VISIT (OUTPATIENT)
Dept: ONCOLOGY | Age: 69
End: 2024-08-23
Payer: MEDICARE

## 2024-08-23 VITALS
WEIGHT: 128.6 LBS | OXYGEN SATURATION: 98 % | HEART RATE: 82 BPM | HEIGHT: 63 IN | TEMPERATURE: 98.1 F | DIASTOLIC BLOOD PRESSURE: 72 MMHG | BODY MASS INDEX: 22.79 KG/M2 | SYSTOLIC BLOOD PRESSURE: 155 MMHG

## 2024-08-23 DIAGNOSIS — R91.8 LUNG NODULES: ICD-10-CM

## 2024-08-23 DIAGNOSIS — C50.912 INVASIVE DUCTAL CARCINOMA OF BREAST, FEMALE, LEFT (HCC): Primary | ICD-10-CM

## 2024-08-23 DIAGNOSIS — M81.0 AGE-RELATED OSTEOPOROSIS WITHOUT CURRENT PATHOLOGICAL FRACTURE: ICD-10-CM

## 2024-08-23 PROCEDURE — 1123F ACP DISCUSS/DSCN MKR DOCD: CPT | Performed by: INTERNAL MEDICINE

## 2024-08-23 PROCEDURE — 99214 OFFICE O/P EST MOD 30 MIN: CPT | Performed by: INTERNAL MEDICINE

## 2024-08-23 PROCEDURE — G8427 DOCREV CUR MEDS BY ELIG CLIN: HCPCS | Performed by: INTERNAL MEDICINE

## 2024-08-23 PROCEDURE — G8399 PT W/DXA RESULTS DOCUMENT: HCPCS | Performed by: INTERNAL MEDICINE

## 2024-08-23 PROCEDURE — G8420 CALC BMI NORM PARAMETERS: HCPCS | Performed by: INTERNAL MEDICINE

## 2024-08-23 PROCEDURE — 99211 OFF/OP EST MAY X REQ PHY/QHP: CPT

## 2024-08-23 PROCEDURE — 3017F COLORECTAL CA SCREEN DOC REV: CPT | Performed by: INTERNAL MEDICINE

## 2024-08-23 PROCEDURE — 4004F PT TOBACCO SCREEN RCVD TLK: CPT | Performed by: INTERNAL MEDICINE

## 2024-08-23 PROCEDURE — 1090F PRES/ABSN URINE INCON ASSESS: CPT | Performed by: INTERNAL MEDICINE

## 2024-08-23 RX ORDER — IBANDRONATE SODIUM 150 MG/1
150 TABLET, FILM COATED ORAL
Qty: 3 TABLET | Refills: 3 | Status: SHIPPED | OUTPATIENT
Start: 2024-08-23 | End: 2025-08-18

## 2024-08-23 ASSESSMENT — PATIENT HEALTH QUESTIONNAIRE - PHQ9
SUM OF ALL RESPONSES TO PHQ9 QUESTIONS 1 & 2: 0
SUM OF ALL RESPONSES TO PHQ QUESTIONS 1-9: 0
SUM OF ALL RESPONSES TO PHQ QUESTIONS 1-9: 0
1. LITTLE INTEREST OR PLEASURE IN DOING THINGS: NOT AT ALL
2. FEELING DOWN, DEPRESSED OR HOPELESS: NOT AT ALL
SUM OF ALL RESPONSES TO PHQ QUESTIONS 1-9: 0
SUM OF ALL RESPONSES TO PHQ QUESTIONS 1-9: 0

## 2024-08-23 NOTE — PROGRESS NOTES
Patient Name: Dianne Espana  Patient : 1955  Patient MRN: [id number]     Primary Oncologist: Gretta Calderon MD  Referring Physician: LIZBETH XIONG ([facility]), YVETTE GARCIA MD, SANJIV AVITIA MD       Date of Service: 2024      Chief Complaint:   Chief Complaint   Patient presents with    Results    Follow-Up from Hospital          Active Problem list  1. Invasive ductal carcinoma of breast, female, left (HCC)    2. Age-related osteoporosis without current pathological fracture           HPI:        62-year-old female history of T2 N0 M0,ER CT HER-2 positive infiltrating ductal carcinoma of the left breast status post lumpectomy sentinel lymph node biopsy on 2015 By Dr. Richard Coughlin. She was treated with adjuvant Taxotere carboplatin and Herceptin for 6 cycles followed by Herceptin for total one year until 2016. Since she has completed radiation, by Dr Grajeda, Receiving a total dose of 45 gray To the whole breast and 61 gray to the lumpectomy cavity boost. and has been on AI since 2016.She has osteoporosis she is taking calcium vitamin D.   2015 she had a T score -2.1 left femoral trochanter noted to be osteoporotic.  2015 EF 60%  2017 mammogram negative  2017 her CA 27.29 was 21.9. Vitamin D was 40   2017 status post Zometa ×1   She is compliant with her AI. And for the most part has no side effects. She does report though that over the last 4-5 months she does have some left chest wall axillary aches and pains. It is definitely related to when she was picking things up that are heavy. She has not noticed any lymphadenopathy lumps or bumps or weakness or paresthesias or swelling.These symptoms have not gotten worse. And as mentioned she definitely notices a relationship to this in regards to her working.    2019: Dexa scan:IMPRESSION:  Osteopenia by WHO criteria.     : Mammogram:IMPRESSION:  Coarse appearing dystrophic calcifications are

## 2024-08-23 NOTE — PROGRESS NOTES
MA Rooming Questions  Patient: Dianne Espana  MRN: [id number]    Date: 8/23/2024        1. Do you have any new issues?   no         2. Do you need any refills on medications?    no    3. Have you had any imaging done since your last visit?   yes - labs and nicole 8/9    4. Have you been hospitalized or seen in the emergency room since your last visit here?   yes - er 6/15    5. Did the patient have a depression screening completed today? Yes    PHQ-9 Total Score: 0 (8/23/2024 10:27 AM)       PHQ-9 Given to (if applicable):               PHQ-9 Score (if applicable):                     [] Positive     []  Negative              Does question #9 need addressed (if applicable)                     [] Yes    []  No               Clau Morrell CMA

## 2024-08-26 ENCOUNTER — TELEPHONE (OUTPATIENT)
Dept: ONCOLOGY | Age: 69
End: 2024-08-26

## 2024-08-26 NOTE — TELEPHONE ENCOUNTER
8/26/24 - spoke w/ pt for the 8/30/24 CT chest at [facility] arrival time of 10:00 am and NPO 4 hours prior.

## 2024-08-30 ENCOUNTER — HOSPITAL ENCOUNTER (OUTPATIENT)
Dept: CT IMAGING | Age: 69
Discharge: HOME | End: 2024-08-30
Attending: INTERNAL MEDICINE
Payer: MEDICARE

## 2024-08-30 DIAGNOSIS — R91.8 LUNG NODULES: ICD-10-CM

## 2024-08-30 PROCEDURE — 6360000004 HC RX CONTRAST MEDICATION: Performed by: INTERNAL MEDICINE

## 2024-08-30 PROCEDURE — 71260 CT THORAX DX C+: CPT

## 2024-08-30 RX ORDER — IOPAMIDOL 755 MG/ML
100 INJECTION, SOLUTION INTRAVASCULAR
Status: COMPLETED | OUTPATIENT
Start: 2024-08-30 | End: 2024-08-30

## 2024-08-30 RX ADMIN — IOPAMIDOL 100 ML: 755 INJECTION, SOLUTION INTRAVENOUS at 10:00

## 2025-01-15 DIAGNOSIS — C50.912 INVASIVE DUCTAL CARCINOMA OF BREAST, FEMALE, LEFT (HCC): ICD-10-CM

## 2025-01-15 RX ORDER — LETROZOLE 2.5 MG/1
2.5 TABLET, FILM COATED ORAL DAILY
Qty: 90 TABLET | Refills: 3 | Status: SHIPPED | OUTPATIENT
Start: 2025-01-15

## 2025-08-19 ENCOUNTER — HOSPITAL ENCOUNTER (OUTPATIENT)
Dept: WOMENS IMAGING | Age: 70
Discharge: HOME | End: 2025-08-19
Payer: MEDICARE

## 2025-08-19 DIAGNOSIS — Z12.31 ENCOUNTER FOR SCREENING MAMMOGRAM FOR MALIGNANT NEOPLASM OF BREAST: ICD-10-CM

## 2025-08-19 PROCEDURE — 77063 BREAST TOMOSYNTHESIS BI: CPT

## 2025-08-26 ENCOUNTER — HOSPITAL ENCOUNTER (OUTPATIENT)
Dept: INFUSION THERAPY | Age: 70
Discharge: HOME | End: 2025-08-26
Payer: MEDICARE

## 2025-08-26 ENCOUNTER — OFFICE VISIT (OUTPATIENT)
Dept: ONCOLOGY | Age: 70
End: 2025-08-26
Payer: MEDICARE

## 2025-08-26 VITALS
WEIGHT: 125 LBS | DIASTOLIC BLOOD PRESSURE: 68 MMHG | SYSTOLIC BLOOD PRESSURE: 139 MMHG | BODY MASS INDEX: 22.15 KG/M2 | HEART RATE: 79 BPM | HEIGHT: 63 IN | TEMPERATURE: 98.1 F | OXYGEN SATURATION: 96 %

## 2025-08-26 DIAGNOSIS — M81.0 AGE-RELATED OSTEOPOROSIS WITHOUT CURRENT PATHOLOGICAL FRACTURE: Primary | ICD-10-CM

## 2025-08-26 DIAGNOSIS — J84.9 INTERSTITIAL LUNG DISEASE (HCC): ICD-10-CM

## 2025-08-26 DIAGNOSIS — C50.912 INVASIVE DUCTAL CARCINOMA OF BREAST, FEMALE, LEFT (HCC): Primary | ICD-10-CM

## 2025-08-26 PROCEDURE — 1159F MED LIST DOCD IN RCRD: CPT | Performed by: INTERNAL MEDICINE

## 2025-08-26 PROCEDURE — 1123F ACP DISCUSS/DSCN MKR DOCD: CPT | Performed by: INTERNAL MEDICINE

## 2025-08-26 PROCEDURE — G8420 CALC BMI NORM PARAMETERS: HCPCS | Performed by: INTERNAL MEDICINE

## 2025-08-26 PROCEDURE — 1126F AMNT PAIN NOTED NONE PRSNT: CPT | Performed by: INTERNAL MEDICINE

## 2025-08-26 PROCEDURE — 3017F COLORECTAL CA SCREEN DOC REV: CPT | Performed by: INTERNAL MEDICINE

## 2025-08-26 PROCEDURE — G8427 DOCREV CUR MEDS BY ELIG CLIN: HCPCS | Performed by: INTERNAL MEDICINE

## 2025-08-26 PROCEDURE — 99214 OFFICE O/P EST MOD 30 MIN: CPT | Performed by: INTERNAL MEDICINE

## 2025-08-26 PROCEDURE — 99212 OFFICE O/P EST SF 10 MIN: CPT

## 2025-08-26 PROCEDURE — 4004F PT TOBACCO SCREEN RCVD TLK: CPT | Performed by: INTERNAL MEDICINE

## 2025-08-26 PROCEDURE — G8399 PT W/DXA RESULTS DOCUMENT: HCPCS | Performed by: INTERNAL MEDICINE

## 2025-08-26 PROCEDURE — 1090F PRES/ABSN URINE INCON ASSESS: CPT | Performed by: INTERNAL MEDICINE

## 2025-08-26 RX ORDER — FAMOTIDINE 10 MG/ML
20 INJECTION, SOLUTION INTRAVENOUS
OUTPATIENT
Start: 2025-09-23

## 2025-08-26 RX ORDER — DIPHENHYDRAMINE HYDROCHLORIDE 50 MG/ML
50 INJECTION, SOLUTION INTRAMUSCULAR; INTRAVENOUS
OUTPATIENT
Start: 2025-09-23

## 2025-08-26 RX ORDER — ONDANSETRON 2 MG/ML
8 INJECTION INTRAMUSCULAR; INTRAVENOUS
OUTPATIENT
Start: 2025-09-23

## 2025-08-26 RX ORDER — HYDROCORTISONE SODIUM SUCCINATE 100 MG/2ML
100 INJECTION INTRAMUSCULAR; INTRAVENOUS
OUTPATIENT
Start: 2025-09-23

## 2025-08-26 RX ORDER — EPINEPHRINE 1 MG/ML
0.3 INJECTION, SOLUTION, CONCENTRATE INTRAVENOUS PRN
OUTPATIENT
Start: 2025-09-23

## 2025-08-26 RX ORDER — ACETAMINOPHEN 325 MG/1
650 TABLET ORAL
OUTPATIENT
Start: 2025-09-23

## 2025-08-26 RX ORDER — SODIUM CHLORIDE 9 MG/ML
INJECTION, SOLUTION INTRAVENOUS PRN
OUTPATIENT
Start: 2025-09-23

## 2025-08-26 RX ORDER — ALBUTEROL SULFATE 90 UG/1
4 INHALANT RESPIRATORY (INHALATION) PRN
OUTPATIENT
Start: 2025-09-23

## 2025-08-26 ASSESSMENT — PATIENT HEALTH QUESTIONNAIRE - PHQ9
SUM OF ALL RESPONSES TO PHQ QUESTIONS 1-9: 0
2. FEELING DOWN, DEPRESSED OR HOPELESS: NOT AT ALL
SUM OF ALL RESPONSES TO PHQ QUESTIONS 1-9: 0
1. LITTLE INTEREST OR PLEASURE IN DOING THINGS: NOT AT ALL
SUM OF ALL RESPONSES TO PHQ QUESTIONS 1-9: 0
SUM OF ALL RESPONSES TO PHQ QUESTIONS 1-9: 0

## 2025-08-27 DIAGNOSIS — Z78.0 MENOPAUSE: Primary | ICD-10-CM

## 2025-08-29 ENCOUNTER — CLINICAL DOCUMENTATION (OUTPATIENT)
Dept: ONCOLOGY | Age: 70
End: 2025-08-29